# Patient Record
Sex: FEMALE | Race: WHITE | NOT HISPANIC OR LATINO | Employment: OTHER | URBAN - METROPOLITAN AREA
[De-identification: names, ages, dates, MRNs, and addresses within clinical notes are randomized per-mention and may not be internally consistent; named-entity substitution may affect disease eponyms.]

---

## 2023-06-29 PROBLEM — E66.01 MORBID OBESITY WITH BMI OF 40.0-44.9, ADULT: Status: ACTIVE | Noted: 2018-12-18

## 2023-06-29 PROBLEM — Z87.891 HISTORY OF TOBACCO ABUSE: Status: ACTIVE | Noted: 2018-12-18

## 2023-06-29 PROBLEM — I25.10 CAD IN NATIVE ARTERY: Status: ACTIVE | Noted: 2018-12-18

## 2023-06-29 PROBLEM — F32.A DEPRESSION: Status: ACTIVE | Noted: 2023-06-29

## 2023-06-29 PROBLEM — E78.5 DYSLIPIDEMIA, GOAL LDL BELOW 70: Status: ACTIVE | Noted: 2018-12-18

## 2023-06-29 PROBLEM — I73.9 PAD (PERIPHERAL ARTERY DISEASE): Status: ACTIVE | Noted: 2019-01-23

## 2023-06-29 PROBLEM — Z99.89 OBSTRUCTIVE SLEEP APNEA ON CPAP: Status: ACTIVE | Noted: 2018-12-18

## 2023-06-29 PROBLEM — G47.33 OBSTRUCTIVE SLEEP APNEA ON CPAP: Status: ACTIVE | Noted: 2018-12-18

## 2023-06-29 PROBLEM — R73.03 PREDIABETES: Status: ACTIVE | Noted: 2023-06-29

## 2023-06-29 PROBLEM — J44.9 COPD (CHRONIC OBSTRUCTIVE PULMONARY DISEASE): Status: ACTIVE | Noted: 2023-06-29

## 2023-06-29 PROBLEM — I10 HTN (HYPERTENSION), BENIGN: Status: ACTIVE | Noted: 2018-12-18

## 2023-08-11 ENCOUNTER — OFFICE VISIT (OUTPATIENT)
Dept: ORTHOPEDIC SURGERY | Facility: CLINIC | Age: 73
End: 2023-08-11
Payer: MEDICARE

## 2023-08-11 VITALS — BODY MASS INDEX: 38.98 KG/M2 | HEIGHT: 63 IN | WEIGHT: 220 LBS

## 2023-08-11 DIAGNOSIS — M17.0 BILATERAL PRIMARY OSTEOARTHRITIS OF KNEE: Primary | ICD-10-CM

## 2023-08-11 NOTE — PROGRESS NOTES
Subjective:     Patient ID: Edilma Doss is a 72 y.o. female.    Chief Complaint:    History of Present Illness  Edilma Doss returns to clinic today for evaluation of right greater than left knee pain.  Patient states that the knee injection on the right knee helped significantly but then she thinks she overdid it a little bit with her house move.  She states over the last few days she is rested and ice her knee and it is improved significantly.  Her left knee is not bothering her much in terms of pain but she does notice popping clicking and grinding when she ambulates.  She is not interested in another knee injection in either knee at this point in time.  She is hopeful to forego knee replacement surgery for as long as possible.     Social History     Occupational History    Not on file   Tobacco Use    Smoking status: Former     Packs/day: 2.00     Years: 30.00     Pack years: 60.00     Types: Cigarettes     Start date: 1966     Quit date: 11/10/1999     Years since quittin.7     Passive exposure: Past    Smokeless tobacco: Never   Vaping Use    Vaping Use: Never used   Substance and Sexual Activity    Alcohol use: Never    Drug use: Never    Sexual activity: Not Currently     Partners: Male     Birth control/protection: Tubal ligation      Past Medical History:   Diagnosis Date    Bursitis of hip hurts when walking and sleeping on side.    COPD (chronic obstructive pulmonary disease)     CTS (carpal tunnel syndrome) yes,  ?    Hip arthrosis 1122/    pain when walking    Knee sprain twisted it Sore    Knee swelling     Periarthritis of shoulder grinds    Tear of meniscus of knee left knee, 5yrs. ago now right knee I think.     Past Surgical History:   Procedure Laterality Date    GALLBLADDER SURGERY      HAND SURGERY  both thumb joints from repitation movements    SHOULDER SURGERY      TRIGGER POINT INJECTION  knleft knee       Family History   Problem Relation Age of Onset    Diabetes  "Mother     Cancer Sister                  Objective:  Vitals:    08/11/23 1310   Weight: 99.8 kg (220 lb)   Height: 160 cm (63\")         08/11/23  1310   Weight: 99.8 kg (220 lb)     Body mass index is 38.97 kg/mý.        Right Knee Exam     Tenderness   The patient is experiencing tenderness in the medial retinaculum and medial joint line.    Range of Motion   Extension:  0   Flexion:  120     Tests   Varus: negative Valgus: negative  Lachman:  Anterior - negative    Posterior - negative  Drawer:  Anterior - negative    Posterior - negative    Other   Erythema: absent  Scars: absent  Sensation: normal  Pulse: present  Swelling: none  Effusion: no effusion present      Left Knee Exam     Tenderness   The patient is experiencing tenderness in the medial retinaculum and medial joint line.    Range of Motion   Extension:  5   Flexion:  110     Tests   Varus: negative Valgus: negative  Lachman:  Anterior - negative    Posterior - negative  Drawer:  Anterior - negative     Posterior - negative    Other   Erythema: absent  Scars: absent  Sensation: normal  Pulse: present  Swelling: mild  Effusion: no effusion present             Imaging: no new    Assessment:        1. Bilateral primary osteoarthritis of knee           Plan:          Discussed treatment options at length with patient at today's visit.  I discussed with the patient that she has left greater than right knee osteoarthritis but at this point in time her left knee is mostly asymptomatic.  Her right knee is improving after few days of rest.  I discussed with her that I would forego knee replacement surgery for as long as possible and that we treated people not x-rays.  I discussed with her that I cannot inject her right knee again yet but I could inject left at any time.  I discussed that it would be in her best interest to forego another knee injection on the right and get on the same schedule so we can inject both knees every 3 months if needed.  I discussed " knee replacement surgery with the patient and answered all of her questions.  She states she would like to think about it and come back and see me at the end of September or early October.  Follow up: September or October 2023 with 4 standing views of bilateral knees      Edilma Doss was in agreement with plan and had all questions answered.     Medications:  No orders of the defined types were placed in this encounter.      Followup:  No follow-ups on file.    Diagnoses and all orders for this visit:    1. Bilateral primary osteoarthritis of knee (Primary)          Dictated utilizing Dragon dictation

## 2023-09-06 ENCOUNTER — OFFICE VISIT (OUTPATIENT)
Dept: CARDIOLOGY | Facility: CLINIC | Age: 73
End: 2023-09-06
Payer: MEDICARE

## 2023-09-06 ENCOUNTER — LAB (OUTPATIENT)
Dept: LAB | Facility: HOSPITAL | Age: 73
End: 2023-09-06
Payer: MEDICARE

## 2023-09-06 VITALS
OXYGEN SATURATION: 98 % | HEART RATE: 55 BPM | DIASTOLIC BLOOD PRESSURE: 80 MMHG | WEIGHT: 210 LBS | BODY MASS INDEX: 37.21 KG/M2 | HEIGHT: 63 IN | SYSTOLIC BLOOD PRESSURE: 122 MMHG

## 2023-09-06 DIAGNOSIS — E78.5 DYSLIPIDEMIA, GOAL LDL BELOW 70: ICD-10-CM

## 2023-09-06 DIAGNOSIS — I73.9 PAD (PERIPHERAL ARTERY DISEASE): ICD-10-CM

## 2023-09-06 DIAGNOSIS — I25.10 CAD IN NATIVE ARTERY: ICD-10-CM

## 2023-09-06 DIAGNOSIS — E78.5 HYPERLIPIDEMIA, UNSPECIFIED HYPERLIPIDEMIA TYPE: ICD-10-CM

## 2023-09-06 DIAGNOSIS — R00.2 PALPITATIONS: ICD-10-CM

## 2023-09-06 DIAGNOSIS — Z87.891 HISTORY OF TOBACCO ABUSE: Primary | ICD-10-CM

## 2023-09-06 LAB
CHOLEST SERPL-MCNC: 139 MG/DL (ref 0–200)
HDLC SERPL-MCNC: 46 MG/DL (ref 40–60)
LDLC SERPL CALC-MCNC: 71 MG/DL (ref 0–100)
LDLC/HDLC SERPL: 1.49 {RATIO}
TRIGL SERPL-MCNC: 123 MG/DL (ref 0–150)
VLDLC SERPL-MCNC: 22 MG/DL (ref 5–40)

## 2023-09-06 PROCEDURE — 36415 COLL VENOUS BLD VENIPUNCTURE: CPT

## 2023-09-06 PROCEDURE — 80061 LIPID PANEL: CPT

## 2023-09-06 RX ORDER — EZETIMIBE 10 MG/1
10 TABLET ORAL DAILY
Qty: 90 TABLET | Refills: 3 | Status: SHIPPED | OUTPATIENT
Start: 2023-09-06

## 2023-09-06 NOTE — PROGRESS NOTES
[facility]    Subjective:     Encounter Date:09/06/23      Patient ID: Edilmanatasha Doss is a 72 y.o. female.    Chief Complaint: No chief complaint on file.  Establish care for CAD  History of Present Illness    Ms. Doss is a 72-year-old with a past medical history hypertension, hyperlipidemia, coronary disease status post PCI 10 years ago at [facility], borderline ischemic cardiomyopathy who presents for further evaluation.      She previously followed the cardiologist at [facility] in [place].  She currently feels well.    She reports intermittent palpitation episodes which is having randomly, sporadically for minutes at a time over the last several years.  She is never had a monitor.  Symptoms she describes as a occasional prominent heartbeat sensation and skipped beats, no heart racing.  Probably PVCs.  She has no chest pain.  She has mild dyspnea on exertion which is attributed to COPD.    As part of her work-up for dyspnea, she underwent a stress test which in 2018 showed a small amount of apical ischemia reportedly, is being managed medically.  She has no overt angina.  She also reports that she had ABIs which revealed peripheral vascular disease, no significant claudication.  An echo in January 2019 showed LVEF of 45 to 50%.      The following portions of the patient's history were reviewed and updated as appropriate: allergies, current medications, past family history, past medical history, past social history, past surgical history and problem list.    Past Medical History:   Diagnosis Date    Bursitis of hip hurts when walking and sleeping on side.    COPD (chronic obstructive pulmonary disease)     CTS (carpal tunnel syndrome) yes, 1986 ?    Hip arthrosis 1122/    pain when walking    Knee sprain twisted it Sore    Knee swelling     Periarthritis of shoulder grinds    Tear of meniscus of knee left knee, 5yrs. ago now right knee I think.       Past  "Surgical History:   Procedure Laterality Date    GALLBLADDER SURGERY      HAND SURGERY  both thumb joints from repitation movements    SHOULDER SURGERY      TRIGGER POINT INJECTION  knleft knee           ECG 12 Lead    Date/Time: 9/6/2023 1:30 PM  Performed by: Vega Javier MD  Authorized by: Vega Javier MD   Previous ECG: no previous ECG available  Rhythm: sinus bradycardia  Rate: bradycardic  Conduction: conduction normal  ST Segments: ST segments normal  T Waves: T waves normal  QRS axis: normal  Other findings: left ventricular hypertrophy    Clinical impression: non-specific ECG           Objective:     Vitals:    09/06/23 1256   BP: 122/80   Pulse: 55   SpO2: 98%   Weight: 95.3 kg (210 lb)   Height: 160 cm (63\")         Constitutional:       Appearance: Healthy appearance. Not in distress.   Neck:      Vascular: JVD normal.   Pulmonary:      Effort: Pulmonary effort is normal.      Breath sounds: Normal breath sounds.   Cardiovascular:      PMI at left midclavicular line. Normal rate. Regular rhythm. Normal S2.       Murmurs: There is no murmur.   Pulses:     Decreased pulses.      Radial: 2+ bilaterally.     Dorsalis pedis: 2+ bilaterally.     Posterior tibial: 1+ on the left side and 2+ on the right side.  Edema:     Peripheral edema absent.   Skin:     General: Skin is warm and dry.   Neurological:      General: No focal deficit present.      Mental Status: Alert, oriented to person, place, and time and oriented to person, place and time.   Psychiatric:         Mood and Affect: Mood and affect normal.       Lab Review:       No results found for: BUN, CREATININE, K, ALT, AST    I reviewed her labs from [place] which revealed an LDL of 75 obtained in June 2023  Performed        Assessment:          Diagnosis Plan   1. History of tobacco abuse        2. PAD (peripheral artery disease)        3. CAD in native artery  Adult Transthoracic Echo Complete w/ Color, Spectral and Contrast if necessary per " protocol    Lipid Panel    Holter Monitor - 72 Hour Up To 15 Days      4. Dyslipidemia, goal LDL below 70        5. Palpitations  Adult Transthoracic Echo Complete w/ Color, Spectral and Contrast if necessary per protocol    Lipid Panel    Holter Monitor - 72 Hour Up To 15 Days      6. Hyperlipidemia, unspecified hyperlipidemia type  Adult Transthoracic Echo Complete w/ Color, Spectral and Contrast if necessary per protocol    Lipid Panel    Holter Monitor - 72 Hour Up To 15 Days             Plan:         Coronary disease status post PCI, remote, [facility]: Free of angina  Mild ischemic cardiomyopathy EF 45 to 50%  Hyperlipidemia  Given her palpitations, below, will repeat echo  Repeat echo to tailor medical therapy, she may need to be transitioned to metoprolol succinate from metoprolol tartrate  May need to add losartan if remains low  Recheck lipids today, may need to add Zetia if remains greater than 70  Or last time was 10 years ago, she has mild peripheral vascular disease and is asymptomatic.  She has been on dual antiplatelet therapy for the last 10 years.  She denies ever being considered be taken off DAPT.  At this point, given the remote PCI, and her advancing age, with bruising on her arms, I favor stopping the aspirin going on Plavix monotherapy.  Patient to stop aspirin 81  Palpitations: Fleeting, short episodes that are likely symptomatic PVCs.  She is on metoprolol which I will continue for now.  We will further evaluate with a 2 weeks patch monitor given her infrequent episodes to make sure there is no other high risk arrhythmias    Thank you for allowing me to participate in the care of Edilma Doss. Feel free to contact me directly with any further questions or concerns.    RTC 6 months for symptom recheck.  Arranging for echocardiogram, possible change in medical therapy, and lipid panel today.  We will try to arrange follow-up with her  to consolidate her visits  together.    Vega Javier MD  [facility]  09/06/23  13:04 EDT       Current Outpatient Medications:     atorvastatin (LIPITOR) 40 MG tablet, Take 1 tablet by mouth Daily., Disp: , Rfl:     cholecalciferol (VITAMIN D3) 10 MCG (400 UNIT) tablet, Take 1 tablet by mouth., Disp: , Rfl:     CINNAMON PO, Take  by mouth., Disp: , Rfl:     clopidogrel (PLAVIX) 75 MG tablet, Take 1 tablet by mouth Daily., Disp: , Rfl:     clotrimazole-betamethasone (LOTRISONE) 1-0.05 % cream, 1 cream two times daily, Disp: , Rfl:     escitalopram (LEXAPRO) 10 MG tablet, Take 1 tablet by mouth., Disp: , Rfl:     Fluticasone-Umeclidin-Vilant (Trelegy Ellipta) 100-62.5-25 MCG/ACT inhaler, Inhale 1 puff., Disp: , Rfl:     Magnesium 250 MG tablet, Take  by mouth., Disp: , Rfl:     metoprolol tartrate (LOPRESSOR) 50 MG tablet, Take 1 tablet by mouth 2 (Two) Times a Day., Disp: , Rfl:     nitroglycerin (NITROSTAT) 0.4 MG SL tablet, DISSOLVE 1 TAB UNDER TONGUE AS NEEDED FOR CHEST PAIN, MAY REPEAT EVERY 5 MINS FOR A MAX OF 3 TABS. THEN CALL 911, Disp: , Rfl:     Semaglutide (Rybelsus) 7 MG tablet, Take 7 mg by mouth Daily., Disp: 30 tablet, Rfl: 0    vitamin C (ASCORBIC ACID) 250 MG tablet, Take 2 tablets by mouth Daily., Disp: , Rfl:     Flaxseed, Linseed, (Flax Seed Oil) 1000 MG capsule, Take 1,000 mg by mouth. (Patient not taking: Reported on 9/6/2023), Disp: , Rfl:          Return in about 6 months (around 3/6/2024).      Part of this note may be an electronic transcription/translation of spoken language to printed text using the Dragon Dictation System.

## 2023-09-06 NOTE — PROGRESS NOTES
Can we please call and let Ms. Doss know that her cholesterol is almost there.  Her bad cholesterol, LDL is 71.  In patients who are at high risk for cardiovascular issues, including prior stents, we just like to see this less than 70, and sometimes less than 55.  I think we can get closer to our goal if we had that medication called Zetia, 10 mg, and take it with the atorvastatin.  This is a benign medication that is well-tolerated without any significant side effects.  Especially in patients who have had a stress test with a possible positive result, like yourself, I think Zetia will do the trick to reduce the risk of future heart attacks.

## 2023-09-06 NOTE — PATIENT INSTRUCTIONS
I would like for you to stop the aspirin, and just take plavix (clopidogrel) 75mg by itself. This is all the blood thinner you need.     We will folloup on your ultrasound and your monitor results to check on your symptoms.     We might need to add zetia to your regimen if your bad cholesterol LDL is still > 70.

## 2023-09-20 DIAGNOSIS — R73.03 PREDIABETES: ICD-10-CM

## 2023-09-20 DIAGNOSIS — I25.10 CAD IN NATIVE ARTERY: ICD-10-CM

## 2023-09-20 DIAGNOSIS — E66.01 MORBID OBESITY WITH BMI OF 40.0-44.9, ADULT: ICD-10-CM

## 2023-09-21 RX ORDER — ORAL SEMAGLUTIDE 7 MG/1
7 TABLET ORAL DAILY
Qty: 30 TABLET | Refills: 2 | Status: SHIPPED | OUTPATIENT
Start: 2023-09-21

## 2023-10-10 ENCOUNTER — HOSPITAL ENCOUNTER (OUTPATIENT)
Dept: CARDIOLOGY | Facility: HOSPITAL | Age: 73
Discharge: HOME | End: 2023-10-10
Admitting: STUDENT IN AN ORGANIZED HEALTH CARE EDUCATION/TRAINING PROGRAM
Payer: MEDICARE

## 2023-10-10 DIAGNOSIS — R00.2 PALPITATIONS: ICD-10-CM

## 2023-10-10 DIAGNOSIS — I25.10 CAD IN NATIVE ARTERY: ICD-10-CM

## 2023-10-10 DIAGNOSIS — E78.5 HYPERLIPIDEMIA, UNSPECIFIED HYPERLIPIDEMIA TYPE: ICD-10-CM

## 2023-10-10 PROCEDURE — 93246 EXT ECG>7D<15D RECORDING: CPT

## 2023-10-16 ENCOUNTER — OFFICE VISIT (OUTPATIENT)
Dept: FAMILY MEDICINE CLINIC | Facility: CLINIC | Age: 73
End: 2023-10-16
Payer: MEDICARE

## 2023-10-16 VITALS
WEIGHT: 211 LBS | BODY MASS INDEX: 37.39 KG/M2 | RESPIRATION RATE: 18 BRPM | SYSTOLIC BLOOD PRESSURE: 126 MMHG | HEART RATE: 72 BPM | HEIGHT: 63 IN | OXYGEN SATURATION: 93 % | DIASTOLIC BLOOD PRESSURE: 82 MMHG

## 2023-10-16 DIAGNOSIS — R73.03 PREDIABETES: ICD-10-CM

## 2023-10-16 DIAGNOSIS — E78.5 DYSLIPIDEMIA, GOAL LDL BELOW 70: ICD-10-CM

## 2023-10-16 DIAGNOSIS — E66.01 MORBID OBESITY WITH BMI OF 40.0-44.9, ADULT: ICD-10-CM

## 2023-10-16 DIAGNOSIS — F33.41 RECURRENT MAJOR DEPRESSIVE DISORDER, IN PARTIAL REMISSION: Primary | ICD-10-CM

## 2023-10-16 RX ORDER — ESCITALOPRAM OXALATE 10 MG/1
10 TABLET ORAL DAILY
Qty: 90 TABLET | Refills: 2 | Status: SHIPPED | OUTPATIENT
Start: 2023-10-16

## 2023-10-16 RX ORDER — ESCITALOPRAM OXALATE 10 MG/1
10 TABLET ORAL DAILY
Qty: 30 TABLET | Refills: 2 | Status: SHIPPED | OUTPATIENT
Start: 2023-10-16 | End: 2023-10-16 | Stop reason: SDUPTHER

## 2023-10-16 NOTE — PROGRESS NOTES
"    Ferny Mosley DO  [facility]  [address] [place] [address] [address]  [place] [id number] [phone]    Subjective      Name Edilma Doss MRN [id number]    1950 AGE/SEX 72 y.o. / female      Chief Complaint Chief Complaint   Patient presents with    Weight Check     Her for weight check and follow up appointment          Visit History for  10/16/2023    History of Present Illness  Edilma Doss is a 72 y.o. female who presented today for Weight Check (Her for weight check and follow up appointment )    The patient reports that she has been feeling good, although she has been mildly anxious. She has just been anxious regarding everything. She has been off her escitalopram (Lexapro) for 2 months. She went from 20 mg to 10 mg of the escitalopram (Lexapro). She states that small things bother her. She is still struggling to decide whether she will take the escitalopram (Lexapro) again or not. She states that she has been significantly stressed as she just finished moving places. She notes that she did most of the tasks. She is still highly stressed at this time.    The patient reports that she has been on semaglutide (Rybelsus) 3 mg for 1 month before she went to this clinic. She was then switched from semaglutide (Rybelsus) 3 mg to 7 mg. She reports that she felt fine on the semaglutide (Rybelsus) 7 mg. She states that she has finally adjusted herself to the nausea, headaches, and other side effects of the semaglutide (Rybelsus) after 1 to 2 months. She has also noticed that the semaglutide (Rybelsus) \"curves her appetite.\" According to her own weighing scale at home, she weighs 205 pounds. She feels that she had lost at least 30 pounds since her last visit at this clinic. Back in 2023, she weighed 220 pounds. Last visit at this clinic, she weighed 210 pounds. Today, 10/16/2023, she weighs 211 pounds. She agrees that she is wearing more clothes today, 10/16/2023. She prefers to " "have a slow, steady weight loss.    The patient's last laboratory tests were from 06/2023. She had her cholesterol checked in 09/2023. She reports that she had been switched by her cardiologist from aspirin 81 mg to ezetimibe (Zetia), as her cholesterol level was significantly elevated.    The patient inquires if she needs to obtain a respiratory syncytial virus injection. She states that she has been diagnosed with chronic obstructive pulmonary disease and has been on fluticasone-umeclidin-vilant (Trelegy Ellipta). She is around her young grandchildren. She states that she loves using the Neti Pot.      Medications and Allergies   Current Outpatient Medications   Medication Instructions    atorvastatin (LIPITOR) 40 mg, Oral, Daily    cholecalciferol (VITAMIN D3) 400 Units, Oral    CINNAMON PO Oral    clopidogrel (PLAVIX) 75 mg, Oral, Daily    clotrimazole-betamethasone (LOTRISONE) 1-0.05 % cream 1 cream two times daily    escitalopram (LEXAPRO) 10 mg, Oral, Daily    ezetimibe (ZETIA) 10 mg, Oral, Daily    Flax Seed Oil 1,000 mg, Oral    Fluticasone-Umeclidin-Vilant (Trelegy Ellipta) 100-62.5-25 MCG/ACT inhaler 1 puff, Inhalation    Magnesium 250 MG tablet Oral    metoprolol tartrate (LOPRESSOR) 50 mg, Oral, 2 Times Daily    nitroglycerin (NITROSTAT) 0.4 MG SL tablet     Rybelsus 7 mg, Oral, Daily    vitamin C (ASCORBIC ACID) 500 mg, Oral, Daily     No Known Allergies   I have reviewed the above medications and allergies     Objective:      Vitals Vitals:    10/16/23 1339   BP: 126/82   BP Location: Right arm   Patient Position: Sitting   Cuff Size: Large Adult   Pulse: 72   Resp: 18   SpO2: 93%   Weight: 95.7 kg (211 lb)   Height: 160 cm (63\")     Body mass index is 37.38 kg/m².    Physical Exam       Assessment/Plan      Issues Addressed/ Plan  1. Recurrent major depressive disorder, in partial remission  - Continue medication at this time.  Going to continue to wait to see how things adjust after moving is " completed and settled.  No changes to medication at this time.    - escitalopram (LEXAPRO) 10 MG tablet; Take 1 tablet by mouth Daily.  Dispense: 90 tablet; Refill: 2    2. Morbid obesity with BMI of 40.0-44.9, adult  3. Prediabetes  - Currently on Rybelsus.  Current dose seems to be helping enough to curb appitie and she is losing wt.  Medication should also be helping with current A1c and BG.  Going to continue with medication at current dose. No changes at this time.   - Working on diet and exercise as well.    - Repeat labs in 2-3 months.        4. Dyslipidemia, goal LDL below 70  - Taking medication as directed at this time. No changes needed.  Going to repeat labs in near future.       There are no Patient Instructions on file for this visit.           Follow up  recommended Return in about 3 months (around 1/16/2024) for Blood Pressure, Wt check, Diabetes.   - Dragon voice recognition software was utilized to complete this chart.  Every reasonable attempt was made to edit and correct the text, however some incorrect words may remain.   Ferny Mosley DO    Transcribed from ambient dictation for Ferny Mosley DO by Bill Garcia.  10/16/23   17:54 EDT    Patient or patient representative verbalized consent to the visit recording.  I have personally performed the services described in this document as transcribed by the above individual, and it is both accurate and complete.

## 2023-10-19 ENCOUNTER — OFFICE VISIT (OUTPATIENT)
Dept: ORTHOPEDIC SURGERY | Facility: CLINIC | Age: 73
End: 2023-10-19
Payer: MEDICARE

## 2023-10-19 VITALS — HEIGHT: 63 IN | BODY MASS INDEX: 37.39 KG/M2 | WEIGHT: 211 LBS

## 2023-10-19 DIAGNOSIS — M17.0 BILATERAL PRIMARY OSTEOARTHRITIS OF KNEE: Primary | ICD-10-CM

## 2023-10-19 RX ORDER — LIDOCAINE HYDROCHLORIDE 10 MG/ML
4 INJECTION, SOLUTION EPIDURAL; INFILTRATION; INTRACAUDAL; PERINEURAL
Status: COMPLETED | OUTPATIENT
Start: 2023-10-19 | End: 2023-10-19

## 2023-10-19 RX ORDER — TRIAMCINOLONE ACETONIDE 40 MG/ML
80 INJECTION, SUSPENSION INTRA-ARTICULAR; INTRAMUSCULAR
Status: COMPLETED | OUTPATIENT
Start: 2023-10-19 | End: 2023-10-19

## 2023-10-19 RX ADMIN — LIDOCAINE HYDROCHLORIDE 4 ML: 10 INJECTION, SOLUTION EPIDURAL; INFILTRATION; INTRACAUDAL; PERINEURAL at 14:33

## 2023-10-19 RX ADMIN — TRIAMCINOLONE ACETONIDE 80 MG: 40 INJECTION, SUSPENSION INTRA-ARTICULAR; INTRAMUSCULAR at 14:33

## 2023-10-19 NOTE — PROGRESS NOTES
Encounter Diagnosis   Name Primary?    Bilateral primary osteoarthritis of knee Yes       Patient presents to clinic today for bilateral knee injection(s). I explained details of injections as well as risks, benefits and alternatives with the patient today, had all questions answered, wished to proceed with injection.  I will see patient back as needed for follow up on injections. Patient was instructed to watch for signs or symptoms of infection including redness, swelling, warmth to the touch, or significant increased pain and to contact our office immediately if any of these issues were noted.      Follow up: As needed but not before January 19, 2024 if she would like another round of injections with 4 views of bilateral knees      - Large Joint Arthrocentesis: bilateral knee on 10/19/2023 2:33 PM  Indications: pain  Details: 22 G needle, anterolateral approach  Medications (Right): 4 mL lidocaine PF 1% 1 %; 80 mg triamcinolone acetonide 40 MG/ML  Medications (Left): 4 mL lidocaine PF 1% 1 %; 80 mg triamcinolone acetonide 40 MG/ML  Outcome: tolerated well, no immediate complications  Procedure, treatment alternatives, risks and benefits explained, specific risks discussed. Consent was given by the patient. Immediately prior to procedure a time out was called to verify the correct patient, procedure, equipment, support staff and site/side marked as required. Patient was prepped and draped in the usual sterile fashion.

## 2023-11-07 ENCOUNTER — TELEPHONE (OUTPATIENT)
Dept: CARDIOLOGY | Facility: CLINIC | Age: 73
End: 2023-11-07
Payer: MEDICARE

## 2023-11-07 NOTE — TELEPHONE ENCOUNTER
Attempted to call pt, no answer and no VM,  will continue to try to reach pt.    Alma Houston, RN  Triage RN  11/07/23 08:49 EST

## 2023-11-07 NOTE — TELEPHONE ENCOUNTER
----- Message from Vega Javier MD sent at 11/6/2023  5:54 PM EST -----  Can we please call Ms. Doss and let her know that her heart monitor did show occasional premature heartbeats, but nothing that would require any special treatments.  Most of her symptoms actually did not correlate to any significant heart rhythm problems, but a few did correlate with to PVCs like we expected.  There were a few fast heartbeats, something called SVT, where the heart would have short runs were beat more quickly, sometimes up to 210 bpm, lasting for up to 20 seconds.  These, however were not associate with any symptoms.  Metoprolol can help treat these 2, but these also do not require treatment.  Overall a reassuring study, no changes needed.  Thank you for the help

## 2023-11-08 NOTE — TELEPHONE ENCOUNTER
Reviewed results and recommendations with Edilma Doss.  Patient verbalized understanding of results and recommendations.    Thank you,  Magdalena GARCIA RN  Triage Nurse [facility]   08:34 EST

## 2023-11-28 ENCOUNTER — TELEPHONE (OUTPATIENT)
Dept: FAMILY MEDICINE CLINIC | Facility: CLINIC | Age: 73
End: 2023-11-28
Payer: MEDICARE

## 2023-11-28 DIAGNOSIS — Z12.31 ENCOUNTER FOR SCREENING MAMMOGRAM FOR BREAST CANCER: Primary | ICD-10-CM

## 2023-12-29 ENCOUNTER — OFFICE VISIT (OUTPATIENT)
Dept: FAMILY MEDICINE CLINIC | Facility: CLINIC | Age: 73
End: 2023-12-29
Payer: MEDICARE

## 2023-12-29 VITALS
BODY MASS INDEX: 35.79 KG/M2 | DIASTOLIC BLOOD PRESSURE: 82 MMHG | SYSTOLIC BLOOD PRESSURE: 122 MMHG | HEART RATE: 67 BPM | WEIGHT: 202 LBS | OXYGEN SATURATION: 100 % | HEIGHT: 63 IN

## 2023-12-29 DIAGNOSIS — J01.40 ACUTE NON-RECURRENT PANSINUSITIS: ICD-10-CM

## 2023-12-29 DIAGNOSIS — R05.9 COUGH, UNSPECIFIED TYPE: Primary | ICD-10-CM

## 2023-12-29 DIAGNOSIS — I10 HTN (HYPERTENSION), BENIGN: ICD-10-CM

## 2023-12-29 DIAGNOSIS — I25.10 CAD IN NATIVE ARTERY: ICD-10-CM

## 2023-12-29 DIAGNOSIS — R73.03 PREDIABETES: ICD-10-CM

## 2023-12-29 DIAGNOSIS — E66.01 MORBID OBESITY WITH BMI OF 40.0-44.9, ADULT: ICD-10-CM

## 2023-12-29 DIAGNOSIS — E55.9 VITAMIN D DEFICIENCY: ICD-10-CM

## 2023-12-29 DIAGNOSIS — E78.5 DYSLIPIDEMIA, GOAL LDL BELOW 70: ICD-10-CM

## 2023-12-29 LAB
EXPIRATION DATE: NORMAL
FLUAV AG UPPER RESP QL IA.RAPID: NOT DETECTED
FLUBV AG UPPER RESP QL IA.RAPID: NOT DETECTED
INTERNAL CONTROL: NORMAL
Lab: NORMAL
SARS-COV-2 AG UPPER RESP QL IA.RAPID: NOT DETECTED

## 2023-12-29 PROCEDURE — 1160F RVW MEDS BY RX/DR IN RCRD: CPT | Performed by: STUDENT IN AN ORGANIZED HEALTH CARE EDUCATION/TRAINING PROGRAM

## 2023-12-29 PROCEDURE — 1159F MED LIST DOCD IN RCRD: CPT | Performed by: STUDENT IN AN ORGANIZED HEALTH CARE EDUCATION/TRAINING PROGRAM

## 2023-12-29 PROCEDURE — 87428 SARSCOV & INF VIR A&B AG IA: CPT | Performed by: STUDENT IN AN ORGANIZED HEALTH CARE EDUCATION/TRAINING PROGRAM

## 2023-12-29 PROCEDURE — 36415 COLL VENOUS BLD VENIPUNCTURE: CPT | Performed by: STUDENT IN AN ORGANIZED HEALTH CARE EDUCATION/TRAINING PROGRAM

## 2023-12-29 PROCEDURE — 3079F DIAST BP 80-89 MM HG: CPT | Performed by: STUDENT IN AN ORGANIZED HEALTH CARE EDUCATION/TRAINING PROGRAM

## 2023-12-29 PROCEDURE — 3074F SYST BP LT 130 MM HG: CPT | Performed by: STUDENT IN AN ORGANIZED HEALTH CARE EDUCATION/TRAINING PROGRAM

## 2023-12-29 PROCEDURE — 99214 OFFICE O/P EST MOD 30 MIN: CPT | Performed by: STUDENT IN AN ORGANIZED HEALTH CARE EDUCATION/TRAINING PROGRAM

## 2023-12-29 RX ORDER — CEFDINIR 300 MG/1
300 CAPSULE ORAL 2 TIMES DAILY
Qty: 14 CAPSULE | Refills: 0 | Status: SHIPPED | OUTPATIENT
Start: 2023-12-29 | End: 2024-01-05

## 2023-12-29 RX ORDER — ORAL SEMAGLUTIDE 14 MG/1
14 TABLET ORAL DAILY
Qty: 30 TABLET | Refills: 2 | Status: SHIPPED | OUTPATIENT
Start: 2023-12-29

## 2023-12-29 RX ORDER — METHYLPREDNISOLONE 4 MG/1
TABLET ORAL
Qty: 21 TABLET | Refills: 0 | Status: SHIPPED | OUTPATIENT
Start: 2023-12-29

## 2023-12-29 NOTE — PROGRESS NOTES
Ferny Mosley,   [facility] PRIMARY [facility]ARE  [facility]  [place] [address] [phone]    Subjective      Name Edilma Doss MRN [id number]    1950 AGE/SEX 73 y.o. / female      Chief Complaint Chief Complaint   Patient presents with    Med Refill     Would like to get refills and would like to discuss medication for constipation    Sinusitis     X2 days, drainage, sinus pressure         Visit History for  2023    History of Present Illness  Edilma Doss is a 73 y.o. female who presented today for Med Refill (Would like to get refills and would like to discuss medication for constipation) and Sinusitis (X2 days, drainage, sinus pressure)    Presents to discuss increasing the Rybelsus recommended at last visit.     Has been drinking smoothies every morning and has not lost any weight.    Inquires if can be prescribed a medication for head cold that has been going on for the past 2 to 3 days. Has been around grandchildren all week who have been ill with cough and runny nose. Felt having a tonsillitis; however, no longer has tonsil. Felt hot last night; however, did not have a fever. Has been using a Neti pot.    Inquires what is the best medication to take for constipation. Has been using MiraLAX every other day or every 2 days. Does not think of drinking water until at night. Drinks a big glass of water every night. Does not drink fluids with meals.    Denies having any issues with vitamin D. Takes an over-the-counter vitamin D pill. Has been doubling vitamin C.    Has an appointment in the middle of 2024 to obtain a mammogram.    Not needing refills on other medications. Only needs refill on the semaglutide (Rybelsus).    Takes Coricidin for  blood pressure.      Medications and Allergies   Current Outpatient Medications   Medication Instructions    atorvastatin (LIPITOR) 40 mg, Oral, Daily    cholecalciferol (VITAMIN D3) 400 Units, Oral    CINNAMON PO  "Oral    clopidogrel (PLAVIX) 75 mg, Oral, Daily    clotrimazole-betamethasone (LOTRISONE) 1-0.05 % cream 1 cream two times daily    escitalopram (LEXAPRO) 10 mg, Oral, Daily    ezetimibe (ZETIA) 10 mg, Oral, Daily    Flax Seed Oil 1,000 mg, Oral    Fluticasone-Umeclidin-Vilant (Trelegy Ellipta) 100-62.5-25 MCG/ACT inhaler 1 puff, Inhalation    Magnesium 250 MG tablet Oral    methylPREDNISolone (MEDROL) 4 MG dose pack Take as directed on package instructions.    metoprolol tartrate (LOPRESSOR) 50 mg, Oral, 2 Times Daily    nitroglycerin (NITROSTAT) 0.4 MG SL tablet     Rybelsus 14 mg, Oral, Daily    vitamin C (ASCORBIC ACID) 500 mg, Oral, Daily     No Known Allergies   I have reviewed the above medications and allergies     Objective:      Vitals Vitals:    12/29/23 1105   BP: 122/82   BP Location: Right arm   Patient Position: Sitting   Cuff Size: Large Adult   Pulse: 67   SpO2: 100%   Weight: 91.6 kg (202 lb)   Height: 160 cm (63\")     Body mass index is 35.78 kg/m².    Physical Exam  Vitals reviewed.   Constitutional:       General: She is not in acute distress.     Appearance: She is not ill-appearing.   Pulmonary:      Effort: Pulmonary effort is normal.   Psychiatric:         Mood and Affect: Mood normal.         Behavior: Behavior normal.         Thought Content: Thought content normal.         Judgment: Judgment normal.            Assessment/Plan      Issues Addressed/ Plan   Diagnosis Plan   1. Cough, unspecified type  POCT SARS-CoV-2 Antigen KATIE + Flu      2. Prediabetes  Semaglutide (Rybelsus) 14 MG tablet    Hemoglobin A1c      3. CAD in native artery        4. Morbid obesity with BMI of 40.0-44.9, adult        5. Dyslipidemia, goal LDL below 70  Lipid panel      6. HTN (hypertension), benign  Comprehensive metabolic panel      7. Vitamin D deficiency  Vitamin D 25 hydroxy      8. Acute non-recurrent pansinusitis  methylPREDNISolone (MEDROL) 4 MG dose pack    cefdinir (OMNICEF) 300 MG capsule          " Upper respiratory infection  - Advised to be tested negative for all tests.  - Will be prescribed prednisone methylprednisolone (Medrol) dose pack.  - Advised to keep using the Neti pot.  - Will be prescribed an antibiotic of cefdinir (Omnicef) to be taken if feeling worse.     Prediabetes  - Will be increased on the semaglutide (Rybelsus) to 14 mg.    Constipation  - Advised to drink plenty of water.  - Advised to drink at least 8 ounces of water before meals.    Health maintenance  - Will be ordered new laboratory tests.  - Advised to follow up in 3 months.      Follow up  recommended Return in about 3 months (around 3/29/2024).   - Dragon voice recognition software was utilized to complete this chart.  Every reasonable attempt was made to edit and correct the text, however some incorrect words may remain.   Ferny Mosley DO      Transcribed from ambient dictation for Ferny Mosley DO by Bill Garcia.  12/29/23   12:40 EST    Patient or patient representative verbalized consent to the visit recording.  I have personally performed the services described in this document as transcribed by the above individual, and it is both accurate and complete.

## 2023-12-29 NOTE — PROGRESS NOTES
Venipuncture Blood Specimen Collection  Venipuncture performed in right arm by Jocy Pool MA with good hemostasis. Patient tolerated the procedure with difficulty. Patient passed out at the end of procedure. Dr. Mosley was present at the time as well as Stella Masterson LPN and .  Patient came to with some disorientation after a few minutes. Was given emesis bag and ice pack. Patient remained in clinic for 1 hour while she rested. She was alert and oriented when she left the clinic   12/29/23   Jocy Pool MA

## 2023-12-30 LAB
25(OH)D3+25(OH)D2 SERPL-MCNC: 39 NG/ML (ref 30–100)
ALBUMIN SERPL-MCNC: 4.5 G/DL (ref 3.5–5.2)
ALBUMIN/GLOB SERPL: 2 G/DL
ALP SERPL-CCNC: 73 U/L (ref 39–117)
ALT SERPL-CCNC: 18 U/L (ref 1–33)
AST SERPL-CCNC: 19 U/L (ref 1–32)
BILIRUB SERPL-MCNC: 0.6 MG/DL (ref 0–1.2)
BUN SERPL-MCNC: 10 MG/DL (ref 8–23)
BUN/CREAT SERPL: 11.2 (ref 7–25)
CALCIUM SERPL-MCNC: 10.2 MG/DL (ref 8.6–10.5)
CHLORIDE SERPL-SCNC: 103 MMOL/L (ref 98–107)
CHOLEST SERPL-MCNC: 117 MG/DL (ref 0–200)
CO2 SERPL-SCNC: 24.2 MMOL/L (ref 22–29)
CREAT SERPL-MCNC: 0.89 MG/DL (ref 0.57–1)
EGFRCR SERPLBLD CKD-EPI 2021: 67.7 ML/MIN/1.73
GLOBULIN SER CALC-MCNC: 2.2 GM/DL
GLUCOSE SERPL-MCNC: 99 MG/DL (ref 65–99)
HBA1C MFR BLD: 5.8 % (ref 4.8–5.6)
HDLC SERPL-MCNC: 52 MG/DL (ref 40–60)
LDLC SERPL CALC-MCNC: 44 MG/DL (ref 0–100)
POTASSIUM SERPL-SCNC: 5 MMOL/L (ref 3.5–5.2)
PROT SERPL-MCNC: 6.7 G/DL (ref 6–8.5)
SODIUM SERPL-SCNC: 139 MMOL/L (ref 136–145)
TRIGL SERPL-MCNC: 115 MG/DL (ref 0–150)
VLDLC SERPL CALC-MCNC: 21 MG/DL (ref 5–40)

## 2024-01-16 ENCOUNTER — OFFICE VISIT (OUTPATIENT)
Dept: CARDIOLOGY | Facility: CLINIC | Age: 74
End: 2024-01-16
Payer: MEDICARE

## 2024-01-16 VITALS
BODY MASS INDEX: 35.19 KG/M2 | WEIGHT: 198.6 LBS | HEART RATE: 64 BPM | SYSTOLIC BLOOD PRESSURE: 140 MMHG | HEIGHT: 63 IN | DIASTOLIC BLOOD PRESSURE: 78 MMHG | OXYGEN SATURATION: 100 %

## 2024-01-16 DIAGNOSIS — I25.10 CAD IN NATIVE ARTERY: Primary | ICD-10-CM

## 2024-01-16 DIAGNOSIS — I10 HTN (HYPERTENSION), BENIGN: ICD-10-CM

## 2024-01-16 DIAGNOSIS — E78.5 DYSLIPIDEMIA, GOAL LDL BELOW 70: ICD-10-CM

## 2024-01-16 DIAGNOSIS — I73.9 PAD (PERIPHERAL ARTERY DISEASE): ICD-10-CM

## 2024-01-16 PROCEDURE — 3078F DIAST BP <80 MM HG: CPT | Performed by: STUDENT IN AN ORGANIZED HEALTH CARE EDUCATION/TRAINING PROGRAM

## 2024-01-16 PROCEDURE — 99214 OFFICE O/P EST MOD 30 MIN: CPT | Performed by: STUDENT IN AN ORGANIZED HEALTH CARE EDUCATION/TRAINING PROGRAM

## 2024-01-16 PROCEDURE — 1159F MED LIST DOCD IN RCRD: CPT | Performed by: STUDENT IN AN ORGANIZED HEALTH CARE EDUCATION/TRAINING PROGRAM

## 2024-01-16 PROCEDURE — 1160F RVW MEDS BY RX/DR IN RCRD: CPT | Performed by: STUDENT IN AN ORGANIZED HEALTH CARE EDUCATION/TRAINING PROGRAM

## 2024-01-16 PROCEDURE — 3077F SYST BP >= 140 MM HG: CPT | Performed by: STUDENT IN AN ORGANIZED HEALTH CARE EDUCATION/TRAINING PROGRAM

## 2024-01-16 RX ORDER — ATORVASTATIN CALCIUM 40 MG/1
40 TABLET, FILM COATED ORAL NIGHTLY
Qty: 90 TABLET | Refills: 3 | Status: SHIPPED | OUTPATIENT
Start: 2024-01-16 | End: 2025-01-15

## 2024-01-16 RX ORDER — EZETIMIBE 10 MG/1
10 TABLET ORAL DAILY
Qty: 90 TABLET | Refills: 3 | Status: SHIPPED | OUTPATIENT
Start: 2024-01-16 | End: 2024-01-16 | Stop reason: SDUPTHER

## 2024-01-16 RX ORDER — NITROGLYCERIN 0.4 MG/1
0.4 TABLET SUBLINGUAL
Qty: 30 TABLET | Refills: 11 | Status: SHIPPED | OUTPATIENT
Start: 2024-01-16 | End: 2025-01-15

## 2024-01-16 RX ORDER — CLOPIDOGREL BISULFATE 75 MG/1
75 TABLET ORAL DAILY
Qty: 90 TABLET | Refills: 3 | Status: SHIPPED | OUTPATIENT
Start: 2024-01-16 | End: 2025-01-15

## 2024-01-16 RX ORDER — CLOPIDOGREL BISULFATE 75 MG/1
75 TABLET ORAL DAILY
Qty: 90 TABLET | Refills: 3 | Status: SHIPPED | OUTPATIENT
Start: 2024-01-16 | End: 2024-01-16 | Stop reason: SDUPTHER

## 2024-01-16 RX ORDER — METOPROLOL SUCCINATE 50 MG/1
50 TABLET, EXTENDED RELEASE ORAL DAILY
Qty: 90 TABLET | Refills: 3 | Status: SHIPPED | OUTPATIENT
Start: 2024-01-16 | End: 2025-01-15

## 2024-01-16 RX ORDER — METOPROLOL TARTRATE 50 MG/1
50 TABLET, FILM COATED ORAL 2 TIMES DAILY
Qty: 180 TABLET | Refills: 3 | Status: SHIPPED | OUTPATIENT
Start: 2024-01-16 | End: 2024-01-16

## 2024-01-16 RX ORDER — ATORVASTATIN CALCIUM 40 MG/1
40 TABLET, FILM COATED ORAL NIGHTLY
Qty: 90 TABLET | Refills: 3 | Status: SHIPPED | OUTPATIENT
Start: 2024-01-16 | End: 2024-01-16 | Stop reason: SDUPTHER

## 2024-01-16 RX ORDER — METOPROLOL SUCCINATE 50 MG/1
50 TABLET, EXTENDED RELEASE ORAL DAILY
Qty: 90 TABLET | Refills: 3 | Status: SHIPPED | OUTPATIENT
Start: 2024-01-16 | End: 2024-01-16 | Stop reason: SDUPTHER

## 2024-01-16 RX ORDER — EZETIMIBE 10 MG/1
10 TABLET ORAL DAILY
Qty: 90 TABLET | Refills: 3 | Status: SHIPPED | OUTPATIENT
Start: 2024-01-16

## 2024-01-16 NOTE — PROGRESS NOTES
[facility]    Subjective:     Encounter Date:01/16/24      Patient ID: Edilmanatasha Doss is a 73 y.o. female.    Chief Complaint: No chief complaint on file.  Establish care for CAD  History of Present Illness    Ms. Doss is a 72-year-old with a past medical history hypertension, hyperlipidemia, coronary disease status post PCI 10 years ago at [facility], borderline ischemic cardiomyopathy who presents for further evaluation.      She previously followed the cardiologist at [facility] in [place].  She currently feels well.    She reports intermittent palpitation episodes which is having randomly, sporadically for minutes at a time over the last several years.  Subsequent Holter monitor revealed some short runs of atrial tachycardia.  Improved with metoprolol for which she continues.    As part of her work-up for dyspnea, she underwent a stress test which in 2018 showed a small amount of apical ischemia reportedly, is being managed medically.  She has no overt angina.  She also reports that she had ABIs which revealed peripheral vascular disease, no significant claudication.  An echo in January 2019 showed LVEF of 45 to 50%.    Since her last visit, she is continuing to build medical therapy and she has done well.  The Holter monitor that revealed a significant issues.  Echo was unable to be scheduled.  Otherwise she is doing well.    The following portions of the patient's history were reviewed and updated as appropriate: allergies, current medications, past family history, past medical history, past social history, past surgical history and problem list.    Past Medical History:   Diagnosis Date    Allergic     Anxiety     Bursitis of hip hurts when walking and sleeping on side.    COPD (chronic obstructive pulmonary disease)     CTS (carpal tunnel syndrome) yes, 1986 ?    Depression     Frozen shoulder     Hip arthrosis 1122/    pain when walking    Knee sprain  "twisted it Sore    Knee swelling     Obesity     Periarthritis of shoulder grinds    Tear of meniscus of knee left knee, 5yrs. ago now right knee I think.       Past Surgical History:   Procedure Laterality Date    CHOLECYSTECTOMY      COLONOSCOPY      CORONARY STENT PLACEMENT      GALLBLADDER SURGERY      HAND SURGERY  both thumb joints from repitation movements    SHOULDER SURGERY      TONSILLECTOMY      TRIGGER POINT INJECTION  knleft knee    TUBAL ABDOMINAL LIGATION           Procedures       Objective:     Vitals:    01/16/24 1514   BP: 140/78   BP Location: Left arm   Pulse: 64   SpO2: 100%   Weight: 90.1 kg (198 lb 9.6 oz)   Height: 160 cm (63\")         Constitutional:       Appearance: Healthy appearance. Not in distress.   Neck:      Vascular: JVD normal.   Pulmonary:      Effort: Pulmonary effort is normal.      Breath sounds: Normal breath sounds.   Cardiovascular:      PMI at left midclavicular line. Normal rate. Regular rhythm. Normal S2.       Murmurs: There is no murmur.   Pulses:     Decreased pulses.      Radial: 2+ bilaterally.     Dorsalis pedis: 2+ bilaterally.     Posterior tibial: 1+ on the left side and 2+ on the right side.  Edema:     Peripheral edema absent.   Skin:     General: Skin is warm and dry.   Neurological:      General: No focal deficit present.      Mental Status: Alert, oriented to person, place, and time and oriented to person, place and time.   Psychiatric:         Mood and Affect: Mood and affect normal.         Lab Review:     Lipid Panel          9/6/2023    13:47 12/29/2023    11:50   Lipid Panel   Total Cholesterol 139     Total Cholesterol  117    Triglycerides 123  115    HDL Cholesterol 46  52    VLDL Cholesterol 22  21    LDL Cholesterol  71  44    LDL/HDL Ratio 1.49       BUN   Date Value Ref Range Status   12/29/2023 10 8 - 23 mg/dL Final     Creatinine   Date Value Ref Range Status   12/29/2023 0.89 0.57 - 1.00 mg/dL Final     Potassium   Date Value Ref Range " Status   12/29/2023 5.0 3.5 - 5.2 mmol/L Final     Comment:     Slight hemolysis detected by analyzer. Result may be  falsely elevated.       ALT (SGPT)   Date Value Ref Range Status   12/29/2023 18 1 - 33 U/L Final     AST (SGOT)   Date Value Ref Range Status   12/29/2023 19 1 - 32 U/L Final     LDL reviewed, 44.  She is intentionally losing weight.        Assessment:          Diagnosis Plan   1. CAD in native artery        2. Dyslipidemia, goal LDL below 70        3. HTN (hypertension), benign  nitroglycerin (NITROSTAT) 0.4 MG SL tablet    ezetimibe (ZETIA) 10 MG tablet    clopidogrel (PLAVIX) 75 MG tablet    atorvastatin (LIPITOR) 40 MG tablet      4. PAD (peripheral artery disease)  nitroglycerin (NITROSTAT) 0.4 MG SL tablet    ezetimibe (ZETIA) 10 MG tablet    clopidogrel (PLAVIX) 75 MG tablet    atorvastatin (LIPITOR) 40 MG tablet               Plan:         Coronary disease status post PCI, remote, [facility]: Free of angina  Mild ischemic cardiomyopathy EF 45 to 50%  Hyperlipidemia  Repeat echo pending    Continue metoprolol succinate 50 daily  May need to consider ARB if her EF remains diminished   Continue Plavix monotherapy, given her mild PVD and known CAD.  She had significant arm bruising with no other medical therapy   Palpitations: Fleeting, short episodes that are likely symptomatic PVCs.  2-week patch Holter did not have any significant arrhythmias.    Obesity.  She continues with weight loss with Rybelsus.  Will keep a watch on her cholesterol but she may no longer need the Zetia if she continues to lose weight.      Will arrange for repeat echo, otherwise RTC in 6 months.  May need to consider ARB if EF remains low    Vega Javier MD  [facility]  01/16/24  13:04 EDT       Current Outpatient Medications:     atorvastatin (LIPITOR) 40 MG tablet, Take 1 tablet by mouth Every Night., Disp: 90 tablet, Rfl: 3    cholecalciferol (VITAMIN D3) 10 MCG (400 UNIT) tablet, Take 1  tablet by mouth., Disp: , Rfl:     CINNAMON PO, Take  by mouth., Disp: , Rfl:     clopidogrel (PLAVIX) 75 MG tablet, Take 1 tablet by mouth Daily., Disp: 90 tablet, Rfl: 3    clotrimazole-betamethasone (LOTRISONE) 1-0.05 % cream, 1 cream two times daily, Disp: , Rfl:     escitalopram (LEXAPRO) 10 MG tablet, Take 1 tablet by mouth Daily., Disp: 90 tablet, Rfl: 2    ezetimibe (ZETIA) 10 MG tablet, Take 1 tablet by mouth Daily., Disp: 90 tablet, Rfl: 3    Flaxseed, Linseed, (Flax Seed Oil) 1000 MG capsule, Take 1,000 mg by mouth., Disp: , Rfl:     Fluticasone-Umeclidin-Vilant (Trelegy Ellipta) 100-62.5-25 MCG/ACT inhaler, Inhale 1 puff., Disp: , Rfl:     Magnesium 250 MG tablet, Take  by mouth., Disp: , Rfl:     methylPREDNISolone (MEDROL) 4 MG dose pack, Take as directed on package instructions., Disp: 21 tablet, Rfl: 0    metoprolol succinate XL (TOPROL-XL) 50 MG 24 hr tablet, Take 1 tablet by mouth Daily., Disp: 90 tablet, Rfl: 3    nitroglycerin (NITROSTAT) 0.4 MG SL tablet, Place 1 tablet under the tongue Every 5 (Five) Minutes As Needed for Chest Pain. Take no more than 3 doses in 15 minutes., Disp: 30 tablet, Rfl: 11    Semaglutide (Rybelsus) 14 MG tablet, Take 1 tablet by mouth Daily., Disp: 30 tablet, Rfl: 2    vitamin C (ASCORBIC ACID) 250 MG tablet, Take 2 tablets by mouth Daily., Disp: , Rfl:          Return in about 6 months (around 7/16/2024).      Part of this note may be an electronic transcription/translation of spoken language to printed text using the Dragon Dictation System.

## 2024-01-16 NOTE — PATIENT INSTRUCTIONS
If you start to notice that the brusiing is annoying or bleeding becomes more frequent, we can stop the plavix and go on aspirin 81 alone. But I would continue on the plavix/clopidogrel alone for now

## 2024-01-25 ENCOUNTER — HOSPITAL ENCOUNTER (OUTPATIENT)
Dept: CARDIOLOGY | Facility: HOSPITAL | Age: 74
Discharge: HOME | End: 2024-01-25
Admitting: STUDENT IN AN ORGANIZED HEALTH CARE EDUCATION/TRAINING PROGRAM
Payer: MEDICARE

## 2024-01-25 VITALS — HEIGHT: 63 IN | BODY MASS INDEX: 35.08 KG/M2 | WEIGHT: 198 LBS

## 2024-01-25 DIAGNOSIS — R00.2 PALPITATIONS: ICD-10-CM

## 2024-01-25 DIAGNOSIS — I25.10 CAD IN NATIVE ARTERY: ICD-10-CM

## 2024-01-25 DIAGNOSIS — E78.5 HYPERLIPIDEMIA, UNSPECIFIED HYPERLIPIDEMIA TYPE: ICD-10-CM

## 2024-01-25 LAB
AORTIC DIMENSIONLESS INDEX: 0.5 (DI)
BH CV ECHO LEFT VENTRICLE GLOBAL LONGITUDINAL STRAIN: -19.7 %
BH CV ECHO MEAS - AO MAX PG: 9.9 MMHG
BH CV ECHO MEAS - AO MEAN PG: 5 MMHG
BH CV ECHO MEAS - AO ROOT DIAM: 2.7 CM
BH CV ECHO MEAS - AO V2 MAX: 157 CM/SEC
BH CV ECHO MEAS - AO V2 VTI: 32.2 CM
BH CV ECHO MEAS - AVA(I,D): 1.13 CM2
BH CV ECHO MEAS - EDV(CUBED): 103.8 ML
BH CV ECHO MEAS - EDV(MOD-SP2): 56 ML
BH CV ECHO MEAS - EDV(MOD-SP4): 63 ML
BH CV ECHO MEAS - EF(MOD-BP): 58.8 %
BH CV ECHO MEAS - EF(MOD-SP2): 58.9 %
BH CV ECHO MEAS - EF(MOD-SP4): 57.1 %
BH CV ECHO MEAS - ESV(CUBED): 48.1 ML
BH CV ECHO MEAS - ESV(MOD-SP2): 23 ML
BH CV ECHO MEAS - ESV(MOD-SP4): 27 ML
BH CV ECHO MEAS - FS: 22.6 %
BH CV ECHO MEAS - IVS/LVPW: 0.82 CM
BH CV ECHO MEAS - IVSD: 0.9 CM
BH CV ECHO MEAS - LAT PEAK E' VEL: 6.3 CM/SEC
BH CV ECHO MEAS - LV DIASTOLIC VOL/BSA (35-75): 32.7 CM2
BH CV ECHO MEAS - LV MASS(C)D: 164.5 GRAMS
BH CV ECHO MEAS - LV MAX PG: 2.26 MMHG
BH CV ECHO MEAS - LV MEAN PG: 1 MMHG
BH CV ECHO MEAS - LV SYSTOLIC VOL/BSA (12-30): 14 CM2
BH CV ECHO MEAS - LV V1 MAX: 75.2 CM/SEC
BH CV ECHO MEAS - LV V1 VTI: 14.6 CM
BH CV ECHO MEAS - LVIDD: 4.7 CM
BH CV ECHO MEAS - LVIDS: 3.6 CM
BH CV ECHO MEAS - LVOT AREA: 2.5 CM2
BH CV ECHO MEAS - LVOT DIAM: 1.79 CM
BH CV ECHO MEAS - LVPWD: 1.1 CM
BH CV ECHO MEAS - MED PEAK E' VEL: 6 CM/SEC
BH CV ECHO MEAS - MV A DUR: 0.09 SEC
BH CV ECHO MEAS - MV A MAX VEL: 119.9 CM/SEC
BH CV ECHO MEAS - MV DEC SLOPE: 276.1 CM/SEC2
BH CV ECHO MEAS - MV DEC TIME: 164 SEC
BH CV ECHO MEAS - MV E MAX VEL: 69.4 CM/SEC
BH CV ECHO MEAS - MV E/A: 0.58
BH CV ECHO MEAS - MV MAX PG: 6 MMHG
BH CV ECHO MEAS - MV MEAN PG: 1.91 MMHG
BH CV ECHO MEAS - MV P1/2T: 68.1 MSEC
BH CV ECHO MEAS - MV V2 VTI: 20.3 CM
BH CV ECHO MEAS - MVA(P1/2T): 3.2 CM2
BH CV ECHO MEAS - MVA(VTI): 1.8 CM2
BH CV ECHO MEAS - PA ACC TIME: 0.1 SEC
BH CV ECHO MEAS - PA V2 MAX: 73.9 CM/SEC
BH CV ECHO MEAS - PULM A REVS DUR: 0.13 SEC
BH CV ECHO MEAS - PULM A REVS VEL: 37.2 CM/SEC
BH CV ECHO MEAS - PULM DIAS VEL: 56.8 CM/SEC
BH CV ECHO MEAS - PULM S/D: 1.12
BH CV ECHO MEAS - PULM SYS VEL: 63.3 CM/SEC
BH CV ECHO MEAS - RAP SYSTOLE: 3 MMHG
BH CV ECHO MEAS - RV MAX PG: 2.31 MMHG
BH CV ECHO MEAS - RV V1 MAX: 76 CM/SEC
BH CV ECHO MEAS - RV V1 VTI: 15.3 CM
BH CV ECHO MEAS - RVSP: 22.3 MMHG
BH CV ECHO MEAS - SI(MOD-SP2): 17.1 ML/M2
BH CV ECHO MEAS - SI(MOD-SP4): 18.7 ML/M2
BH CV ECHO MEAS - SV(LVOT): 36.5 ML
BH CV ECHO MEAS - SV(MOD-SP2): 33 ML
BH CV ECHO MEAS - SV(MOD-SP4): 36 ML
BH CV ECHO MEAS - TAPSE (>1.6): 2.5 CM
BH CV ECHO MEAS - TR MAX PG: 19.3 MMHG
BH CV ECHO MEAS - TR MAX VEL: 219.6 CM/SEC
BH CV ECHO MEASUREMENTS AVERAGE E/E' RATIO: 11.28
BH CV XLRA - RV BASE: 3.3 CM
BH CV XLRA - RV LENGTH: 7.6 CM
BH CV XLRA - RV MID: 3 CM
BH CV XLRA - TDI S': 16.1 CM/SEC
LEFT ATRIUM VOLUME INDEX: 23.6 ML/M2

## 2024-01-25 PROCEDURE — 93356 MYOCRD STRAIN IMG SPCKL TRCK: CPT

## 2024-01-25 PROCEDURE — 93306 TTE W/DOPPLER COMPLETE: CPT

## 2024-01-25 NOTE — PROGRESS NOTES
Can we please call Ms. Doss and let her know the good news that her heart ultrasound shows that her heart muscle has recovered its function.  It is back to normal.  Her ejection fraction is 55% which is normal.  Her heart is in excellent shape and is working normally.  I do not think she needs any changes in her medical therapy right now.

## 2024-01-30 ENCOUNTER — HOSPITAL ENCOUNTER (OUTPATIENT)
Dept: MAMMOGRAPHY | Facility: HOSPITAL | Age: 74
Discharge: HOME | End: 2024-01-30
Admitting: STUDENT IN AN ORGANIZED HEALTH CARE EDUCATION/TRAINING PROGRAM
Payer: MEDICARE

## 2024-01-30 PROCEDURE — 77067 SCR MAMMO BI INCL CAD: CPT

## 2024-01-30 PROCEDURE — 77063 BREAST TOMOSYNTHESIS BI: CPT

## 2024-03-04 DIAGNOSIS — R73.03 PREDIABETES: ICD-10-CM

## 2024-03-04 RX ORDER — ORAL SEMAGLUTIDE 14 MG/1
14 TABLET ORAL DAILY
Qty: 90 TABLET | Refills: 0 | Status: SHIPPED | OUTPATIENT
Start: 2024-03-04

## 2024-03-04 NOTE — TELEPHONE ENCOUNTER
Caller: RomarioEdilma    Relationship: Self    Best call back number: [phone]    Requested Prescriptions:   Requested Prescriptions     Pending Prescriptions Disp Refills    Semaglutide (Rybelsus) 14 MG tablet 30 tablet 2     Sig: Take 1 tablet by mouth Daily.        Pharmacy where request should be sent: [facility] ([facility]) - [place] [address] [facility] [phone] [facility] [phone]      Last office visit with prescribing clinician: 12/29/2023   Last telemedicine visit with prescribing clinician: Visit date not found   Next office visit with prescribing clinician: 7/1/2024     Additional details provided by patient: WOULD LIKE A 90 DAYS SUPPLY    Does the patient have less than a 3 day supply:  [x] Yes  [] No    Would you like a call back once the refill request has been completed: [] Yes [x] No    If the office needs to give you a call back, can they leave a voicemail: [] Yes [x] No    Kaykay Hairston Rep   03/04/24 11:22 EST

## 2024-03-05 ENCOUNTER — OFFICE VISIT (OUTPATIENT)
Dept: ORTHOPEDIC SURGERY | Facility: CLINIC | Age: 74
End: 2024-03-05
Payer: MEDICARE

## 2024-03-05 VITALS — BODY MASS INDEX: 33.73 KG/M2 | WEIGHT: 190.4 LBS | HEIGHT: 63 IN

## 2024-03-05 DIAGNOSIS — M17.0 BILATERAL PRIMARY OSTEOARTHRITIS OF KNEE: Primary | ICD-10-CM

## 2024-03-05 PROCEDURE — 99213 OFFICE O/P EST LOW 20 MIN: CPT | Performed by: INTERNAL MEDICINE

## 2024-03-05 PROCEDURE — 20610 DRAIN/INJ JOINT/BURSA W/O US: CPT | Performed by: INTERNAL MEDICINE

## 2024-03-05 PROCEDURE — 73564 X-RAY EXAM KNEE 4 OR MORE: CPT | Performed by: INTERNAL MEDICINE

## 2024-03-05 RX ORDER — TRIAMCINOLONE ACETONIDE 40 MG/ML
80 INJECTION, SUSPENSION INTRA-ARTICULAR; INTRAMUSCULAR
Status: COMPLETED | OUTPATIENT
Start: 2024-03-05 | End: 2024-03-05

## 2024-03-05 RX ORDER — LIDOCAINE HYDROCHLORIDE 10 MG/ML
4 INJECTION, SOLUTION EPIDURAL; INFILTRATION; INTRACAUDAL; PERINEURAL
Status: COMPLETED | OUTPATIENT
Start: 2024-03-05 | End: 2024-03-05

## 2024-03-05 RX ADMIN — LIDOCAINE HYDROCHLORIDE 4 ML: 10 INJECTION, SOLUTION EPIDURAL; INFILTRATION; INTRACAUDAL; PERINEURAL at 13:42

## 2024-03-05 RX ADMIN — TRIAMCINOLONE ACETONIDE 80 MG: 40 INJECTION, SUSPENSION INTRA-ARTICULAR; INTRAMUSCULAR at 13:42

## 2024-03-05 NOTE — PROGRESS NOTES
Subjective:     Patient ID: Edilma Doss is a 73 y.o. female.    Chief Complaint:    History of Present Illness  Edilma Doss returns to clinic today for evaluation of bilateral left greater than right knee pain.  She states that the pain is worse on the left than the right.  She states that she fell back in January when it was slippery and had a big bruise which has slowly resolved.  She is still hopeful to avoid surgery and states that the last injection given in October lasted up till this point.  She states she thought she could have potentially going longer between injections if she had not fallen.  She would like to have bilateral knee injections performed today and is still hopeful to avoid knee replacement surgery.     Social History     Occupational History    Not on file   Tobacco Use    Smoking status: Former     Current packs/day: 0.00     Average packs/day: 2.0 packs/day for 32.9 years (65.9 ttl pk-yrs)     Types: Cigarettes     Start date: 1966     Quit date: 11/10/1999     Years since quittin.3     Passive exposure: Past    Smokeless tobacco: Never   Vaping Use    Vaping status: Never Used   Substance and Sexual Activity    Alcohol use: Never    Drug use: Never    Sexual activity: Not Currently     Partners: Male     Birth control/protection: Tubal ligation      Past Medical History:   Diagnosis Date    Allergic     Anxiety     Bursitis of hip hurts when walking and sleeping on side.    COPD (chronic obstructive pulmonary disease)     CTS (carpal tunnel syndrome) yes,  ?    Depression     Frozen shoulder     Hip arthrosis 1122/    pain when walking    Knee sprain twisted it Sore    Knee swelling     Obesity     Periarthritis of shoulder grinds    Tear of meniscus of knee left knee, 5yrs. ago now right knee I think.     Past Surgical History:   Procedure Laterality Date    CHOLECYSTECTOMY      COLONOSCOPY      CORONARY STENT PLACEMENT      GALLBLADDER SURGERY      HAND SURGERY   "both thumb joints from repitation movements    SHOULDER SURGERY      TONSILLECTOMY      TRIGGER POINT INJECTION  knleft knee    TUBAL ABDOMINAL LIGATION         Family History   Problem Relation Age of Onset    Diabetes Mother     Breast cancer Father     Cancer Sister     Breast cancer Maternal Aunt                  Objective:  Vitals:    24 1326   Weight: 86.4 kg (190 lb 6.4 oz)   Height: 160 cm (63\")         24  1326   Weight: 86.4 kg (190 lb 6.4 oz)     Body mass index is 33.73 kg/m².        Right Knee Exam     Tenderness   The patient is experiencing tenderness in the medial retinaculum, medial joint line and patella.    Range of Motion   Extension:  0   Flexion:  110     Tests   Varus: negative Valgus: negative  Lachman:  Anterior - negative    Posterior - negative  Drawer:  Anterior - negative    Posterior - negative    Other   Erythema: absent  Scars: absent  Sensation: normal  Pulse: present  Swelling: none  Effusion: no effusion present      Left Knee Exam     Tenderness   The patient is experiencing tenderness in the medial retinaculum, medial joint line and patella.    Range of Motion   Extension:  5   Flexion:  110     Tests   Varus: negative Valgus: negative  Lachman:  Anterior - negative    Posterior - negative  Drawer:  Anterior - negative     Posterior - negative    Other   Erythema: absent  Scars: absent  Sensation: normal  Pulse: present  Swelling: none  Effusion: no effusion present               Imagin views of the bilateral knee were ordered and reviewed by myself in the office today  Indication: bilateral knee pain  Findings: X-rays demonstrate no acute osseous abnormality.  There is no signs of fracture dislocation or subluxation.  There is joint space narrowing, subchondral sclerosis, cystic changes, and periarticular osteophytes most pronounced in the Medial.  Comparative studies: None      Assessment:        1. Bilateral primary osteoarthritis of knee           Plan:  - " Large Joint Arthrocentesis: bilateral knee on 3/5/2024 1:42 PM  Indications: pain  Details: 22 G needle, anterolateral approach  Medications (Right): 80 mg triamcinolone acetonide 40 MG/ML; 4 mL lidocaine PF 1% 1 %  Medications (Left): 80 mg triamcinolone acetonide 40 MG/ML; 4 mL lidocaine PF 1% 1 %  Outcome: tolerated well, no immediate complications  Procedure, treatment alternatives, risks and benefits explained, specific risks discussed. Consent was given by the patient. Immediately prior to procedure a time out was called to verify the correct patient, procedure, equipment, support staff and site/side marked as required. Patient was prepped and draped in the usual sterile fashion.                 Discussed treatment options at length with patient at today's visit.  I discussed the patient that as long as her pain is controlled with injections and conservative treatments we can continue that for as long as she would like. I discussed with the patient that the mainstays of conservative treatment for knee OA include physical therapy, nonsteroidal anti-inflammatories, injections, activity modification, and home exercises.  The patient states that they  would like to try another round of bilateral knee intra-articular corticosteroid injections.  At this point time the patient does not need to schedule follow-up with me today.  I am happy to see the patient back at any point time however if issues arise or they fail to make a full recovery.  Follow up: As needed without x-rays for an injection visit but not before 6/5/2024      Edilma Doss was in agreement with plan and had all questions answered.     Medications:  No orders of the defined types were placed in this encounter.      Followup:  No follow-ups on file.    Diagnoses and all orders for this visit:    1. Bilateral primary osteoarthritis of knee (Primary)  -     XR Knee 4+ View Bilateral  -     - Large Joint Arthrocentesis: bilateral  knee          Dictated utilizing Dragon dictation

## 2024-03-06 ENCOUNTER — TELEPHONE (OUTPATIENT)
Dept: FAMILY MEDICINE CLINIC | Facility: CLINIC | Age: 74
End: 2024-03-06
Payer: MEDICARE

## 2024-03-06 NOTE — TELEPHONE ENCOUNTER
"    Caller: Edilma Doss \"ALIA\"    Relationship: Self    Best call back number:     Caller requesting test results:     What test was performed: XRAY OF KNEES    When was the test performed: 03/05/24    Where was the test performed: [facility]    Additional notes: PATIENT IS CALLING IN TO SEE IF DR MARIEE HAS HER XRAY RESULTS IN YET.  SHE WANTS TO BE CALLED TO DISCUSS.       "

## 2024-03-24 DIAGNOSIS — R73.03 PREDIABETES: ICD-10-CM

## 2024-03-25 RX ORDER — ORAL SEMAGLUTIDE 14 MG/1
1 TABLET ORAL DAILY
Qty: 30 TABLET | Refills: 2 | Status: SHIPPED | OUTPATIENT
Start: 2024-03-25 | End: 2024-03-27 | Stop reason: SDUPTHER

## 2024-03-27 ENCOUNTER — TELEPHONE (OUTPATIENT)
Dept: FAMILY MEDICINE CLINIC | Facility: CLINIC | Age: 74
End: 2024-03-27
Payer: MEDICARE

## 2024-03-27 DIAGNOSIS — R73.03 PREDIABETES: ICD-10-CM

## 2024-03-27 RX ORDER — ORAL SEMAGLUTIDE 14 MG/1
1 TABLET ORAL DAILY
Qty: 90 TABLET | Refills: 0 | Status: SHIPPED | OUTPATIENT
Start: 2024-03-27

## 2024-03-27 NOTE — TELEPHONE ENCOUNTER
"Caller: Edilma Doss \"ALIA\"    Relationship: Self    Best call back number: [phone]     What medication are you requesting: Semaglutide (Rybelsus) 14 MG tablet     If a prescription is needed, what is your preferred pharmacy and phone number: [facility] ([facility]) - [place], [place] - [address] [facility] [phone] [facility] [phone] [facility]     Additional notes: PATIENT REQUESTS TO HAVE PRESCRIPTION TRANSFERRED TO [facility]        "

## 2024-04-19 ENCOUNTER — OFFICE VISIT (OUTPATIENT)
Dept: FAMILY MEDICINE CLINIC | Facility: CLINIC | Age: 74
End: 2024-04-19
Payer: MEDICARE

## 2024-04-19 VITALS
WEIGHT: 180 LBS | OXYGEN SATURATION: 94 % | HEIGHT: 63 IN | HEART RATE: 62 BPM | DIASTOLIC BLOOD PRESSURE: 78 MMHG | RESPIRATION RATE: 16 BRPM | TEMPERATURE: 97.7 F | SYSTOLIC BLOOD PRESSURE: 130 MMHG | BODY MASS INDEX: 31.89 KG/M2

## 2024-04-19 DIAGNOSIS — J98.8 RESPIRATORY INFECTION: ICD-10-CM

## 2024-04-19 DIAGNOSIS — J40 BRONCHITIS: Primary | ICD-10-CM

## 2024-04-19 PROCEDURE — 3078F DIAST BP <80 MM HG: CPT | Performed by: STUDENT IN AN ORGANIZED HEALTH CARE EDUCATION/TRAINING PROGRAM

## 2024-04-19 PROCEDURE — 3075F SYST BP GE 130 - 139MM HG: CPT | Performed by: STUDENT IN AN ORGANIZED HEALTH CARE EDUCATION/TRAINING PROGRAM

## 2024-04-19 PROCEDURE — 99213 OFFICE O/P EST LOW 20 MIN: CPT | Performed by: STUDENT IN AN ORGANIZED HEALTH CARE EDUCATION/TRAINING PROGRAM

## 2024-04-19 RX ORDER — AMOXICILLIN AND CLAVULANATE POTASSIUM 875; 125 MG/1; MG/1
1 TABLET, FILM COATED ORAL 2 TIMES DAILY
Qty: 14 TABLET | Refills: 0 | Status: SHIPPED | OUTPATIENT
Start: 2024-04-19 | End: 2024-04-26

## 2024-04-19 RX ORDER — METHYLPREDNISOLONE 4 MG/1
TABLET ORAL
Qty: 21 TABLET | Refills: 0 | Status: SHIPPED | OUTPATIENT
Start: 2024-04-19 | End: 2024-05-08

## 2024-05-08 ENCOUNTER — OFFICE VISIT (OUTPATIENT)
Dept: FAMILY MEDICINE CLINIC | Facility: CLINIC | Age: 74
End: 2024-05-08
Payer: MEDICARE

## 2024-05-08 VITALS
RESPIRATION RATE: 16 BRPM | BODY MASS INDEX: 31.71 KG/M2 | OXYGEN SATURATION: 100 % | HEART RATE: 72 BPM | WEIGHT: 179 LBS | SYSTOLIC BLOOD PRESSURE: 130 MMHG | DIASTOLIC BLOOD PRESSURE: 78 MMHG | HEIGHT: 63 IN

## 2024-05-08 DIAGNOSIS — J32.9 RHINOSINUSITIS: Primary | ICD-10-CM

## 2024-05-08 PROCEDURE — 99213 OFFICE O/P EST LOW 20 MIN: CPT | Performed by: STUDENT IN AN ORGANIZED HEALTH CARE EDUCATION/TRAINING PROGRAM

## 2024-05-08 PROCEDURE — 1159F MED LIST DOCD IN RCRD: CPT | Performed by: STUDENT IN AN ORGANIZED HEALTH CARE EDUCATION/TRAINING PROGRAM

## 2024-05-08 PROCEDURE — 3078F DIAST BP <80 MM HG: CPT | Performed by: STUDENT IN AN ORGANIZED HEALTH CARE EDUCATION/TRAINING PROGRAM

## 2024-05-08 PROCEDURE — 3075F SYST BP GE 130 - 139MM HG: CPT | Performed by: STUDENT IN AN ORGANIZED HEALTH CARE EDUCATION/TRAINING PROGRAM

## 2024-05-08 PROCEDURE — 1160F RVW MEDS BY RX/DR IN RCRD: CPT | Performed by: STUDENT IN AN ORGANIZED HEALTH CARE EDUCATION/TRAINING PROGRAM

## 2024-05-08 PROCEDURE — 1125F AMNT PAIN NOTED PAIN PRSNT: CPT | Performed by: STUDENT IN AN ORGANIZED HEALTH CARE EDUCATION/TRAINING PROGRAM

## 2024-05-08 RX ORDER — PREDNISONE 20 MG/1
TABLET ORAL
Qty: 19 TABLET | Refills: 0 | Status: SHIPPED | OUTPATIENT
Start: 2024-05-08 | End: 2024-05-18

## 2024-05-25 NOTE — PROGRESS NOTES
Ferny Mosley,   [facility] PRIMARY CARE  [address]  [place] [id number] [phone]    Subjective      Name Edilma Doss MRN [id number]    1950 AGE/SEX 73 y.o. / female      Chief Complaint Chief Complaint   Patient presents with    URI     Was treated for bronchitis a few weeks ago and finished abx, has not regained sense of smell. Still has head congestion and slight cough. Has been going on for 2-3 weeks and decided it time to get checked out again          Visit History for  2024    History of Present Illness  Edilma Doss is a 73 y.o. female who presented today for URI (Was treated for bronchitis a few weeks ago and finished abx, has not regained sense of smell. Still has head congestion and slight cough. Has been going on for 2-3 weeks and decided it time to get checked out again )      The patient reports a loss of taste and smell for the past 3 weeks, accompanied by a sensation of tenderness in her throat and significant pressure in her head. Despite the absence of a lingering cough, she continues to experience nasal congestion and rhinorrhea. Her treatment regimen for pneumonia was successfully completed, however, upon completion of the antibiotic course, her olfactory and gustatory senses returned. She tested negative for COVID-19 for the 2nd day. Her current treatment regimen includes the use of Vicks nasal spray and generic Zyrtec, although she does not take it daily. She also uses a neti pot every morning or every other day, although she has not used it in the past few days due to the head pressure. She continues to perform nasal irrigation.        Medications and Allergies   Current Outpatient Medications   Medication Instructions    atorvastatin (LIPITOR) 40 mg, Oral, Nightly    cholecalciferol (VITAMIN D3) 400 Units, Oral    CINNAMON PO Oral    clopidogrel (PLAVIX) 75 mg, Oral, Daily    clotrimazole-betamethasone (LOTRISONE) 1-0.05 %  "cream 1 cream two times daily    escitalopram (LEXAPRO) 10 mg, Oral, Daily    ezetimibe (ZETIA) 10 mg, Oral, Daily    Flax Seed Oil 1,000 mg, Oral    Fluticasone-Umeclidin-Vilant (Trelegy Ellipta) 100-62.5-25 MCG/ACT inhaler 1 puff, Inhalation    Magnesium 250 MG tablet Oral    metoprolol succinate XL (TOPROL-XL) 50 mg, Oral, Daily    nitroglycerin (NITROSTAT) 0.4 mg, Sublingual, Every 5 Minutes PRN, Take no more than 3 doses in 15 minutes.    Rybelsus 14 mg, Oral, Daily    vitamin C (ASCORBIC ACID) 500 mg, Oral, Daily     No Known Allergies   I have reviewed the above medications and allergies     Objective:      Vitals Vitals:    05/08/24 1210   BP: 130/78   BP Location: Left arm   Patient Position: Sitting   Cuff Size: Adult   Pulse: 72   Resp: 16   SpO2: 100%   Weight: 81.2 kg (179 lb)   Height: 160 cm (63\")     Body mass index is 31.71 kg/m².    Physical Exam  Vitals reviewed.   Constitutional:       General: She is not in acute distress.     Appearance: She is not ill-appearing.   HENT:      Right Ear: Tympanic membrane, ear canal and external ear normal.      Left Ear: Tympanic membrane, ear canal and external ear normal.      Nose: Congestion and rhinorrhea present.      Mouth/Throat:      Pharynx: Posterior oropharyngeal erythema present.   Cardiovascular:      Rate and Rhythm: Normal rate and regular rhythm.   Pulmonary:      Effort: Pulmonary effort is normal.      Breath sounds: Normal breath sounds.   Psychiatric:         Mood and Affect: Mood normal.         Behavior: Behavior normal.         Thought Content: Thought content normal.         Judgment: Judgment normal.       Physical Exam     Results          Assessment/Plan      Issues Addressed/ Plan   Diagnosis Plan   1. Rhinosinusitis  predniSONE (DELTASONE) 20 MG tablet         Assessment & Plan  1. Anosmia and ageusia.  The patient was informed that her olfactory and gustatory senses should gradually reappear, albeit with a period of dryness before " noticeably reappear. The possibility of early satiety due to nocturnal postnasal drip was also discussed. A tapering course of prednisone will be initiated for a duration of 10 days. Additionally, montelukast will be prescribed. The patient is advised to take Zyrtec daily until she feels fully recovered, after which she can discontinue its use. She is also advised to continue nasal irrigation.           There are no Patient Instructions on file for this visit.        Follow up  recommended Return if symptoms worsen or fail to improve.   - Dragon voice recognition software was utilized to complete this chart.  Every reasonable attempt was made to edit and correct the text, however some incorrect words may remain.   Ferny Mosley DO    Patient or patient representative verbalized consent for the use of Ambient Listening during the visit with  Ferny Mosley DO for chart documentation. 5/25/2024  01:26 EDT

## 2024-06-06 ENCOUNTER — TELEPHONE (OUTPATIENT)
Age: 74
End: 2024-06-06
Payer: MEDICARE

## 2024-06-06 RX ORDER — ASPIRIN 81 MG/1
81 TABLET ORAL DAILY
Qty: 90 TABLET | Refills: 3 | Status: SHIPPED | OUTPATIENT
Start: 2024-06-06 | End: 2025-06-06

## 2024-06-06 NOTE — TELEPHONE ENCOUNTER
If she continues to have significant arm bruising then she can stop taking the Plavix medication and were going to replace it with aspirin 81 mg a day.  I have changed her chart to reflect this.

## 2024-06-06 NOTE — TELEPHONE ENCOUNTER
Pt is calling to let you know that she is still bruising on her arm. She was told to call you if it continues and maybe you would change her medicine?    PT #: [phone]

## 2024-07-01 ENCOUNTER — OFFICE VISIT (OUTPATIENT)
Dept: FAMILY MEDICINE CLINIC | Facility: CLINIC | Age: 74
End: 2024-07-01
Payer: MEDICARE

## 2024-07-01 VITALS
OXYGEN SATURATION: 99 % | SYSTOLIC BLOOD PRESSURE: 120 MMHG | HEART RATE: 70 BPM | RESPIRATION RATE: 18 BRPM | DIASTOLIC BLOOD PRESSURE: 68 MMHG | HEIGHT: 63 IN | BODY MASS INDEX: 32.25 KG/M2 | WEIGHT: 182 LBS

## 2024-07-01 DIAGNOSIS — R73.03 PREDIABETES: ICD-10-CM

## 2024-07-01 DIAGNOSIS — I10 HTN (HYPERTENSION), BENIGN: ICD-10-CM

## 2024-07-01 DIAGNOSIS — E78.5 DYSLIPIDEMIA, GOAL LDL BELOW 70: ICD-10-CM

## 2024-07-01 DIAGNOSIS — D64.9 ANEMIA, UNSPECIFIED TYPE: ICD-10-CM

## 2024-07-01 DIAGNOSIS — Z00.00 MEDICARE ANNUAL WELLNESS VISIT, SUBSEQUENT: Primary | ICD-10-CM

## 2024-07-01 PROCEDURE — 3078F DIAST BP <80 MM HG: CPT | Performed by: STUDENT IN AN ORGANIZED HEALTH CARE EDUCATION/TRAINING PROGRAM

## 2024-07-01 PROCEDURE — 36415 COLL VENOUS BLD VENIPUNCTURE: CPT | Performed by: STUDENT IN AN ORGANIZED HEALTH CARE EDUCATION/TRAINING PROGRAM

## 2024-07-01 PROCEDURE — 1125F AMNT PAIN NOTED PAIN PRSNT: CPT | Performed by: STUDENT IN AN ORGANIZED HEALTH CARE EDUCATION/TRAINING PROGRAM

## 2024-07-01 PROCEDURE — G0439 PPPS, SUBSEQ VISIT: HCPCS | Performed by: STUDENT IN AN ORGANIZED HEALTH CARE EDUCATION/TRAINING PROGRAM

## 2024-07-01 PROCEDURE — 3074F SYST BP LT 130 MM HG: CPT | Performed by: STUDENT IN AN ORGANIZED HEALTH CARE EDUCATION/TRAINING PROGRAM

## 2024-07-01 NOTE — PROGRESS NOTES
The ABCs of the Annual Wellness Visit  Subsequent Medicare Wellness Visit    Chief Complaint   Patient presents with    Medicare Wellness-subsequent      Subjective    History of Present Illness:  Edilma Doss is a 73 y.o. female who presents for a Subsequent Medicare Wellness Visit.    The following portions of the patient's history were reviewed and   updated as appropriate: allergies, current medications, past family history, past medical history, past social history, past surgical history, and problem list.    Compared to one year ago, the patient feels her physical   health is the same.    Compared to one year ago, the patient feels her mental   health is better.  Has been around the great grand kids and loves it.      Recent Hospitalizations:  She was not admitted to the hospital during the last year.       Current Medical Providers:  Patient Care Team:  Ferny Mosley DO as PCP - General (Family Medicine)    Outpatient Medications Prior to Visit   Medication Sig Dispense Refill    aspirin 81 MG EC tablet Take 1 tablet by mouth Daily. 90 tablet 3    atorvastatin (LIPITOR) 40 MG tablet Take 1 tablet by mouth Every Night. 90 tablet 3    cholecalciferol (VITAMIN D3) 10 MCG (400 UNIT) tablet Take 1 tablet by mouth.      CINNAMON PO Take  by mouth.      clotrimazole-betamethasone (LOTRISONE) 1-0.05 % cream 1 cream two times daily      escitalopram (LEXAPRO) 10 MG tablet Take 1 tablet by mouth Daily. 90 tablet 2    ezetimibe (ZETIA) 10 MG tablet Take 1 tablet by mouth Daily. 90 tablet 3    Flaxseed, Linseed, (Flax Seed Oil) 1000 MG capsule Take 1,000 mg by mouth.      Fluticasone-Umeclidin-Vilant (Trelegy Ellipta) 100-62.5-25 MCG/ACT inhaler Inhale 1 puff.      Magnesium 250 MG tablet Take  by mouth.      metoprolol succinate XL (TOPROL-XL) 50 MG 24 hr tablet Take 1 tablet by mouth Daily. 90 tablet 3    Semaglutide (Rybelsus) 14 MG tablet Take 1 tablet by mouth Daily. 90 tablet 0    vitamin C (ASCORBIC ACID)  "250 MG tablet Take 2 tablets by mouth Daily.      nitroglycerin (NITROSTAT) 0.4 MG SL tablet Place 1 tablet under the tongue Every 5 (Five) Minutes As Needed for Chest Pain. Take no more than 3 doses in 15 minutes. 30 tablet 11     No facility-administered medications prior to visit.       No opioid medication identified on active medication list. I have reviewed chart for other potential  high risk medication/s and harmful drug interactions in the elderly.        Aspirin is on active medication list. Aspirin use is indicated based on review of current medical condition/s. Pros and cons of this therapy have been discussed today. Benefits of this medication outweigh potential harm.  Patient has been encouraged to continue taking this medication.  .      Patient Active Problem List   Diagnosis    CAD in native artery    COPD (chronic obstructive pulmonary disease)    Depression    Dyslipidemia, goal LDL below 70    History of tobacco abuse    HTN (hypertension), benign    Morbid obesity with BMI of 40.0-44.9, adult    Obstructive sleep apnea on CPAP    PAD (peripheral artery disease)    Prediabetes    OA (osteoarthritis) of knee     Advance Care Planning  Advance Directive is not on file.  ACP discussion was held with the patient during this visit. Patient does not have an advance directive, information provided.          Objective    Vitals:    07/01/24 1400   BP: 120/68   BP Location: Left arm   Patient Position: Sitting   Cuff Size: Adult   Pulse: 70   Resp: 18   SpO2: 99%   Weight: 82.6 kg (182 lb)   Height: 160 cm (63\")     Estimated body mass index is 32.24 kg/m² as calculated from the following:    Height as of this encounter: 160 cm (63\").    Weight as of this encounter: 82.6 kg (182 lb).       Does the patient have evidence of cognitive impairment? No, Mini Mental exam is normal.    Physical Exam  Vitals reviewed.   Constitutional:       General: She is not in acute distress.     Appearance: She is not " ill-appearing.   HENT:      Right Ear: Tympanic membrane, ear canal and external ear normal.      Left Ear: Tympanic membrane, ear canal and external ear normal.   Cardiovascular:      Rate and Rhythm: Normal rate and regular rhythm.   Pulmonary:      Effort: Pulmonary effort is normal.      Breath sounds: Normal breath sounds.   Neurological:      Mental Status: She is alert.   Psychiatric:         Mood and Affect: Mood normal.         Behavior: Behavior normal.         Thought Content: Thought content normal.         Judgment: Judgment normal.       Lab Results   Component Value Date    CHLPL 124 2024    TRIG 130 2024    HDL 57 2024    LDL 44 2024    VLDL 23 2024    HGBA1C 5.7 (H) 2024            HEALTH RISK ASSESSMENT    Smoking Status:  Social History     Tobacco Use   Smoking Status Former    Current packs/day: 0.00    Average packs/day: 2.0 packs/day for 32.9 years (65.9 ttl pk-yrs)    Types: Cigarettes    Start date: 1966    Quit date: 11/10/1999    Years since quittin.6    Passive exposure: Past   Smokeless Tobacco Never     Alcohol Consumption:  Social History     Substance and Sexual Activity   Alcohol Use Never     Fall Risk Screen:    STEADI Fall Risk Assessment was completed, and patient is at LOW risk for falls.Assessment completed on:2024    Depression Screenin/1/2024     2:06 PM   PHQ-2/PHQ-9 Depression Screening   Little Interest or Pleasure in Doing Things 0-->not at all   Feeling Down, Depressed or Hopeless 0-->not at all   PHQ-9: Brief Depression Severity Measure Score 0       Health Habits and Functional and Cognitive Screenin/1/2024     2:03 PM   Functional & Cognitive Status   Do you have difficulty preparing food and eating? No   Do you have difficulty bathing yourself, getting dressed or grooming yourself? No   Do you have difficulty using the toilet? No   Do you have difficulty moving around from place to place? No   Do you  have trouble with steps or getting out of a bed or a chair? No   Current Diet Well Balanced Diet   Dental Exam Not up to date   Eye Exam Up to date   Exercise (times per week) 7 times per week   Current Exercises Include Yard Work;House Cleaning   Do you need help using the phone?  No   Are you deaf or do you have serious difficulty hearing?  No   Do you need help to go to places out of walking distance? No   Do you need help shopping? No   Do you need help preparing meals?  No   Do you need help with housework?  No   Do you need help with laundry? No   Do you need help taking your medications? No   Do you need help managing money? No   Do you ever drive or ride in a car without wearing a seat belt? No   Have you felt unusual stress, anger or loneliness in the last month? No   Who do you live with? Spouse   If you need help, do you have trouble finding someone available to you? No   Do you have difficulty concentrating, remembering or making decisions? No       Age-appropriate Screening Schedule:  Refer to the list below for future screening recommendations based on patient's age, sex and/or medical conditions. Orders for these recommended tests are listed in the plan section. The patient has been provided with a written plan.    Health Maintenance   Topic Date Due    TDAP/TD VACCINES (1 - Tdap) Never done    HEPATITIS C SCREENING  Never done    ANNUAL WELLNESS VISIT  Never done    COVID-19 Vaccine (6 - 2023-24 season) 09/01/2023    INFLUENZA VACCINE  08/01/2024    BMI FOLLOWUP  03/27/2025    LIPID PANEL  07/01/2025    DXA SCAN  07/13/2025    COLORECTAL CANCER SCREENING  06/14/2032    RSV Vaccine - Adults  Completed    Pneumococcal Vaccine 65+  Completed    ZOSTER VACCINE  Completed          Assessment & Plan   CMS Preventative Services Quick Reference  Risk Factors Identified During Encounter  None Identified  The above risks/problems have been discussed with the patient.  Follow up actions/plans if indicated are  seen below in the Assessment/Plan Section.  Pertinent information has been shared with the patient in the After Visit Summary.    Diagnoses and all orders for this visit:    1. Medicare annual wellness visit, subsequent (Primary)    2. Dyslipidemia, goal LDL below 70  -     Lipid panel    3. HTN (hypertension), benign  -     Comprehensive metabolic panel    4. Prediabetes  -     Hemoglobin A1c    5. Anemia, unspecified type  -     CBC w AUTO Differential        Follow Up:   Return in about 3 months (around 10/1/2024).     An After Visit Summary and PPPS were made available to the patient.

## 2024-07-01 NOTE — PROGRESS NOTES
Venipuncture Blood Specimen Collection  Venipuncture performed in right arm by Jocy Pool MA with good hemostasis. Patient tolerated the procedure well without complications.   07/01/24   Jocy Pool MA

## 2024-07-02 LAB
ALBUMIN SERPL-MCNC: 4.1 G/DL (ref 3.8–4.8)
ALP SERPL-CCNC: 60 IU/L (ref 44–121)
ALT SERPL-CCNC: 25 IU/L (ref 0–32)
AST SERPL-CCNC: 23 IU/L (ref 0–40)
BASOPHILS # BLD AUTO: 0 X10E3/UL (ref 0–0.2)
BASOPHILS NFR BLD AUTO: 1 %
BILIRUB SERPL-MCNC: 0.4 MG/DL (ref 0–1.2)
BUN SERPL-MCNC: 24 MG/DL (ref 8–27)
BUN/CREAT SERPL: 27 (ref 12–28)
CALCIUM SERPL-MCNC: 9.7 MG/DL (ref 8.7–10.3)
CHLORIDE SERPL-SCNC: 103 MMOL/L (ref 96–106)
CHOLEST SERPL-MCNC: 124 MG/DL (ref 100–199)
CO2 SERPL-SCNC: 24 MMOL/L (ref 20–29)
CREAT SERPL-MCNC: 0.9 MG/DL (ref 0.57–1)
EGFRCR SERPLBLD CKD-EPI 2021: 67 ML/MIN/1.73
EOSINOPHIL # BLD AUTO: 0.1 X10E3/UL (ref 0–0.4)
EOSINOPHIL NFR BLD AUTO: 1 %
ERYTHROCYTE [DISTWIDTH] IN BLOOD BY AUTOMATED COUNT: 12.6 % (ref 11.7–15.4)
GLOBULIN SER CALC-MCNC: 2.3 G/DL (ref 1.5–4.5)
GLUCOSE SERPL-MCNC: 84 MG/DL (ref 70–99)
HBA1C MFR BLD: 5.7 % (ref 4.8–5.6)
HCT VFR BLD AUTO: 40.3 % (ref 34–46.6)
HDLC SERPL-MCNC: 57 MG/DL
HGB BLD-MCNC: 13 G/DL (ref 11.1–15.9)
IMM GRANULOCYTES # BLD AUTO: 0 X10E3/UL (ref 0–0.1)
IMM GRANULOCYTES NFR BLD AUTO: 0 %
LDLC SERPL CALC-MCNC: 44 MG/DL (ref 0–99)
LYMPHOCYTES # BLD AUTO: 2.8 X10E3/UL (ref 0.7–3.1)
LYMPHOCYTES NFR BLD AUTO: 34 %
MCH RBC QN AUTO: 29.7 PG (ref 26.6–33)
MCHC RBC AUTO-ENTMCNC: 32.3 G/DL (ref 31.5–35.7)
MCV RBC AUTO: 92 FL (ref 79–97)
MONOCYTES # BLD AUTO: 0.6 X10E3/UL (ref 0.1–0.9)
MONOCYTES NFR BLD AUTO: 8 %
NEUTROPHILS # BLD AUTO: 4.7 X10E3/UL (ref 1.4–7)
NEUTROPHILS NFR BLD AUTO: 56 %
PLATELET # BLD AUTO: 213 X10E3/UL (ref 150–450)
POTASSIUM SERPL-SCNC: 4.7 MMOL/L (ref 3.5–5.2)
PROT SERPL-MCNC: 6.4 G/DL (ref 6–8.5)
RBC # BLD AUTO: 4.38 X10E6/UL (ref 3.77–5.28)
SODIUM SERPL-SCNC: 140 MMOL/L (ref 134–144)
TRIGL SERPL-MCNC: 130 MG/DL (ref 0–149)
VLDLC SERPL CALC-MCNC: 23 MG/DL (ref 5–40)
WBC # BLD AUTO: 8.3 X10E3/UL (ref 3.4–10.8)

## 2024-07-05 ENCOUNTER — OFFICE VISIT (OUTPATIENT)
Dept: ORTHOPEDIC SURGERY | Facility: CLINIC | Age: 74
End: 2024-07-05
Payer: MEDICARE

## 2024-07-05 VITALS — WEIGHT: 182 LBS | HEIGHT: 63 IN | BODY MASS INDEX: 32.25 KG/M2

## 2024-07-05 DIAGNOSIS — Z47.1 AFTERCARE FOLLOWING LEFT KNEE JOINT REPLACEMENT SURGERY: ICD-10-CM

## 2024-07-05 DIAGNOSIS — Z96.652 AFTERCARE FOLLOWING LEFT KNEE JOINT REPLACEMENT SURGERY: ICD-10-CM

## 2024-07-05 DIAGNOSIS — M17.11 PRIMARY OSTEOARTHRITIS OF RIGHT KNEE: ICD-10-CM

## 2024-07-05 DIAGNOSIS — M17.12 PRIMARY OSTEOARTHRITIS OF LEFT KNEE: Primary | ICD-10-CM

## 2024-07-05 DIAGNOSIS — R79.9 ABNORMAL FINDING OF BLOOD CHEMISTRY, UNSPECIFIED: ICD-10-CM

## 2024-07-05 PROBLEM — M17.9 OA (OSTEOARTHRITIS) OF KNEE: Status: ACTIVE | Noted: 2024-07-05

## 2024-07-05 RX ORDER — PREGABALIN 150 MG/1
150 CAPSULE ORAL ONCE
OUTPATIENT
Start: 2024-07-05 | End: 2024-07-05

## 2024-07-05 RX ORDER — CHLORHEXIDINE GLUCONATE 500 MG/1
CLOTH TOPICAL ONCE
OUTPATIENT
Start: 2024-07-05

## 2024-07-05 RX ORDER — TRIAMCINOLONE ACETONIDE 40 MG/ML
80 INJECTION, SUSPENSION INTRA-ARTICULAR; INTRAMUSCULAR
Status: COMPLETED | OUTPATIENT
Start: 2024-07-05 | End: 2024-07-05

## 2024-07-05 RX ORDER — LIDOCAINE HYDROCHLORIDE 10 MG/ML
4 INJECTION, SOLUTION EPIDURAL; INFILTRATION; INTRACAUDAL; PERINEURAL
Status: COMPLETED | OUTPATIENT
Start: 2024-07-05 | End: 2024-07-05

## 2024-07-05 RX ORDER — MELOXICAM 7.5 MG/1
15 TABLET ORAL ONCE
OUTPATIENT
Start: 2024-07-05 | End: 2024-07-05

## 2024-07-05 RX ADMIN — LIDOCAINE HYDROCHLORIDE 4 ML: 10 INJECTION, SOLUTION EPIDURAL; INFILTRATION; INTRACAUDAL; PERINEURAL at 13:18

## 2024-07-05 RX ADMIN — TRIAMCINOLONE ACETONIDE 80 MG: 40 INJECTION, SUSPENSION INTRA-ARTICULAR; INTRAMUSCULAR at 13:18

## 2024-07-05 NOTE — PROGRESS NOTES
Subjective:     Patient ID: Edilma Doss is a 73 y.o. female.    Chief Complaint:    History of Present Illness  Edilma Doss returns to clinic today for evaluation of left knee pain/OA.  She states that her left knee has become quite severe and debilitating and she would like to be considered for left total knee arthroplasty.  She states that the injections are no longer lasting like they did before.  She denies any new injury.  She states the pain is located anterior medially in her knee and is worse with activity and better with rest.  She has tried NSAIDs PT significant weight loss and corticosteroid injections.     Social History     Occupational History    Not on file   Tobacco Use    Smoking status: Former     Current packs/day: 0.00     Average packs/day: 2.0 packs/day for 32.9 years (65.9 ttl pk-yrs)     Types: Cigarettes     Start date: 1966     Quit date: 11/10/1999     Years since quittin.6     Passive exposure: Past    Smokeless tobacco: Never   Vaping Use    Vaping status: Never Used   Substance and Sexual Activity    Alcohol use: Never    Drug use: Never    Sexual activity: Not Currently     Partners: Male     Birth control/protection: Tubal ligation      Past Medical History:   Diagnosis Date    Allergic     Anxiety     Bursitis of hip hurts when walking and sleeping on side.    COPD (chronic obstructive pulmonary disease)     CTS (carpal tunnel syndrome) yes,  ?    Depression     Frozen shoulder     Hip arthrosis 1122/    pain when walking    Knee sprain twisted it Sore    Knee swelling     Obesity     Periarthritis of shoulder grinds    Tear of meniscus of knee left knee, 5yrs. ago now right knee I think.     Past Surgical History:   Procedure Laterality Date    CHOLECYSTECTOMY      COLONOSCOPY      CORONARY STENT PLACEMENT      GALLBLADDER SURGERY      HAND SURGERY  both thumb joints from repitation movements    SHOULDER SURGERY      TONSILLECTOMY      TRIGGER POINT  "INJECTION  knleft knee    TUBAL ABDOMINAL LIGATION         Family History   Problem Relation Age of Onset    Diabetes Mother     Breast cancer Father     Cancer Sister     Breast cancer Maternal Aunt                  Objective:  Vitals:    07/05/24 1252   Weight: 82.6 kg (182 lb)   Height: 160 cm (63\")         07/05/24  1252   Weight: 82.6 kg (182 lb)     Body mass index is 32.24 kg/m².        Left Knee Exam     Tenderness   The patient is experiencing tenderness in the medial retinaculum, medial joint line and medial hamstring.    Range of Motion   Extension:  5   Flexion:  120     Tests   Varus: negative Valgus: negative  Lachman:  Anterior - negative    Posterior - negative  Drawer:  Anterior - negative     Posterior - negative    Other   Erythema: absent  Scars: absent  Sensation: normal  Pulse: present  Swelling: mild  Effusion: no effusion present             Imaging: no new    Assessment:        1. Primary osteoarthritis of left knee    2. Aftercare following left knee joint replacement surgery    3. Abnormal finding of blood chemistry, unspecified           Plan:  - Large Joint Arthrocentesis: R knee on 7/5/2024 1:18 PM  Indications: pain  Details: 22 G needle, superolateral approach  Medications: 4 mL lidocaine PF 1% 1 %; 80 mg triamcinolone acetonide 40 MG/ML  Outcome: tolerated well, no immediate complications  Procedure, treatment alternatives, risks and benefits explained, specific risks discussed. Consent was given by the patient. Immediately prior to procedure a time out was called to verify the correct patient, procedure, equipment, support staff and site/side marked as required. Patient was prepped and draped in the usual sterile fashion.           She has failed conservative management of left knee osteoarthritis including knee injections, nsaids, pt, and weight loss .  We discussed further conservative treatments including but not limited to physical therapy, intra-articular injections, " nonsteroidal anti-inflammatories, topical creams, use of an assistive device, weight loss, and low impact exercises.  Shevoiced understanding of further nonoperative treatment options but She is interested in proceeding with knee replacement surgery.    The spectrum of treatment options were discussed with the patient in detail including both the nonoperative and operative treatment modalities and their respective risks and benefits.  After thorough discussion, the patient has elected to undergo surgical treatment.  The details of the surgical procedure were explained including the location of probable incisions and a description of the likely implants to be used.  Models and diagrams were used as educational resources. The patient understands the likely convalescence after surgery, as well as the rehabilitation required.  We thoroughly discussed the risks, benefits, and alternatives to surgery.  The risks include but are not limited to the risk of infection, joint stiffness, blood clots (including DVT and/or pulmonary embolus along with the risk of death), neurologic and/or vascular injury, fracture, dislocation, nonunion, malunion, need for further surgery including hardware failure requiring revision, and continued pain.  It was explained that if tissue has been repaired or reconstructed, there is also a chance of failure which may require further management.  Following the completion of the discussion, the patient expressed understanding of this planned course of care, all their questions were answered and consent will be obtained preoperatively.  I also reviewed the typical postoperative recovery of 6-12 months before maximal recovery, and possible need for rehabilitation stay after hospitalization. I also explained that in some series of patients in the research literature, up to 20% of patients are dissatisfied with their total knee arthroplasty. I also discussed the risks of of anesthesia. I also explained  that she would meet with Anesthesiology preoperatively to discuss anesthetic risk.  All questions were answered.     Plan for left total knee arthroplasty.    Implants: Olivares and Nephew cemented Legion TKS with [employer]  Anticoagulation: Asprin  Antibiotics: Cefazolin  Admission Type: 23 HR Obs  TXA: Yes      Edilma Doss was in agreement with plan and had all questions answered.     Medications:  No orders of the defined types were placed in this encounter.      Followup:  No follow-ups on file.    Diagnoses and all orders for this visit:    1. Primary osteoarthritis of left knee (Primary)  -     Case Request; Standing  -     CBC and Differential; Future  -     Basic metabolic panel; Future  -     MRSA Screen Culture (Outpatient) - Swab, Nares; Future  -     Hemoglobin A1c; Future  -     Type and screen; Future  -     Urinalysis With Culture If Indicated -; Future  -     lactated ringers bolus 500 mL  -     Tranexamic Acid 1,000 mg in sodium chloride 0.9 % 100 mL  -     Tranexamic Acid 1,000 mg in sodium chloride 0.9 % 100 mL  -     Chlorhexidine Gluconate Cloth 2 % pads  -     ethyl alcohol 62 % 2 each  -     ceFAZolin (ANCEF) 2 g in sodium chloride 0.9 % 100 mL IVPB  -     meloxicam (MOBIC) tablet 15 mg  -     pregabalin (LYRICA) capsule 150 mg  -     Case Request    2. Aftercare following left knee joint replacement surgery  -     CBC and Differential; Future    3. Abnormal finding of blood chemistry, unspecified  -     Hemoglobin A1c; Future    Other orders  -     Follow Anesthesia Guidelines / Protocol; Future  -     Follow Anesthesia Guidelines / Protocol; Standing  -     Verify NPO Status; Standing  -     SCD (sequential compression device)- to be placed on patient in Pre-op; Standing  -     Clip operative site; Standing  -     Obtain informed consent (if not collected inpatient or PAT); Standing  -     Care Order / Instruction for all Female Patients: Clean Catch Urinalysis with culture if  indicated if patient symptomatic: dysuria, flank or lower abdominal pain, burning, urgency or frequency  -     Initiate Observation Status; Standing  -     Nerve Block; Standing          Dictated utilizing Dragon dictation

## 2024-07-09 ENCOUNTER — OFFICE VISIT (OUTPATIENT)
Dept: CARDIOLOGY | Facility: CLINIC | Age: 74
End: 2024-07-09
Payer: MEDICARE

## 2024-07-09 VITALS
DIASTOLIC BLOOD PRESSURE: 70 MMHG | HEIGHT: 63 IN | HEART RATE: 66 BPM | SYSTOLIC BLOOD PRESSURE: 118 MMHG | BODY MASS INDEX: 31.89 KG/M2 | WEIGHT: 180 LBS

## 2024-07-09 DIAGNOSIS — E78.2 MIXED HYPERLIPIDEMIA: ICD-10-CM

## 2024-07-09 DIAGNOSIS — I25.10 CAD IN NATIVE ARTERY: Primary | ICD-10-CM

## 2024-07-09 DIAGNOSIS — E78.5 DYSLIPIDEMIA, GOAL LDL BELOW 70: ICD-10-CM

## 2024-07-09 DIAGNOSIS — Z01.810 PREOP CARDIOVASCULAR EXAM: ICD-10-CM

## 2024-07-09 PROCEDURE — 93000 ELECTROCARDIOGRAM COMPLETE: CPT | Performed by: STUDENT IN AN ORGANIZED HEALTH CARE EDUCATION/TRAINING PROGRAM

## 2024-07-09 PROCEDURE — 99214 OFFICE O/P EST MOD 30 MIN: CPT | Performed by: STUDENT IN AN ORGANIZED HEALTH CARE EDUCATION/TRAINING PROGRAM

## 2024-07-09 PROCEDURE — 3074F SYST BP LT 130 MM HG: CPT | Performed by: STUDENT IN AN ORGANIZED HEALTH CARE EDUCATION/TRAINING PROGRAM

## 2024-07-09 PROCEDURE — 3078F DIAST BP <80 MM HG: CPT | Performed by: STUDENT IN AN ORGANIZED HEALTH CARE EDUCATION/TRAINING PROGRAM

## 2024-07-09 NOTE — PROGRESS NOTES
[facility]    Subjective:     Encounter Date:07/09/24      Patient ID: Edilma Doss is a 73 y.o. female.    Chief Complaint:   Chief Complaint   Patient presents with    Coronary Artery Disease   Establish care for CAD  History of Present Illness    Ms. Doss is a 72-year-old with a past medical history hypertension, hyperlipidemia, coronary disease status post PCI 2010's at [facility], borderline ischemic cardiomyopathy who presents for further evaluation.      She previously followed the cardiologist at [facility] in [place].      She reports intermittent palpitation episodes which is having randomly, sporadically for minutes at a time over the last several years.  Subsequent Holter monitor revealed some short runs of atrial tachycardia.  Improved with metoprolol for which she continues.    As part of her work-up for dyspnea, she underwent a stress test which in 2018 showed a small amount of apical ischemia reportedly, is being managed medically.  She has no overt angina.  She also reports that she had ABIs which revealed peripheral vascular disease, no significant claudication.  An echo in January 2019 showed LVEF of 45 to 50%.    Since her last visit, she has intentionally lost over 100 pounds.  She reports her functional capacity is good, she is able to escalate flights of stairs without stopping, she is able to do yard work and she has no angina or any dyspnea symptoms.  She underwent a repeat echocardiogram but showed recovery of her LVEF.  She otherwise has no complaints today and feels well.    Echocardiogram January 2024:    Left ventricular systolic function is normal. Calculated left ventricular EF = 58.8%    Left ventricular diastolic function was normal.    Estimated right ventricular systolic pressure from tricuspid regurgitation is normal (<35 mmHg).    Holter monitor November 2023:    A relatively benign monitor study.    There were a total of  "3 supraventricular runs with longest lasting 19.2 seconds with average heart rate of 110 bpm.    A total of 14 patient triggered and 13 diary events submitted.  Symptoms included skipped beats/lightheadedness.  Most did not have any arrhythmia correlation but few episodes correlate with premature ventricular complexes.    The following portions of the patient's history were reviewed and updated as appropriate: allergies, current medications, past family history, past medical history, past social history, past surgical history and problem list.    Past Medical History:   Diagnosis Date    Allergic     Anxiety     Bursitis of hip hurts when walking and sleeping on side.    COPD (chronic obstructive pulmonary disease)     CTS (carpal tunnel syndrome) yes, 1986 ?    Depression     Frozen shoulder     Hip arthrosis 1122/    pain when walking    Knee sprain twisted it Sore    Knee swelling     Obesity     Periarthritis of shoulder grinds    Tear of meniscus of knee left knee, 5yrs. ago now right knee I think.       Past Surgical History:   Procedure Laterality Date    CHOLECYSTECTOMY      COLONOSCOPY      CORONARY STENT PLACEMENT      GALLBLADDER SURGERY      HAND SURGERY  both thumb joints from repitation movements    SHOULDER SURGERY      TONSILLECTOMY      TRIGGER POINT INJECTION  knleft knee    TUBAL ABDOMINAL LIGATION             ECG 12 Lead    Date/Time: 7/9/2024 3:22 PM  Performed by: Vega Javier MD    Authorized by: Vega Javier MD  Comparison: compared with previous ECG from 9/6/2023  Similar to previous ECG  Rhythm: sinus rhythm  Rate: normal  Conduction: conduction normal  ST Segments: ST segments normal  T Waves: T waves normal  QRS axis: normal  Other: no other findings    Clinical impression: normal ECG             Objective:     Vitals:    07/09/24 1505   BP: 118/70   Pulse: 66   Weight: 81.6 kg (180 lb)   Height: 160 cm (63\")           Constitutional:       Appearance: Healthy appearance. Not in " distress.   Neck:      Vascular: JVD normal.   Pulmonary:      Effort: Pulmonary effort is normal.      Breath sounds: Normal breath sounds.   Cardiovascular:      PMI at left midclavicular line. Normal rate. Regular rhythm. Normal S2.       Murmurs: There is no murmur.   Pulses:     Decreased pulses.      Radial: 2+ bilaterally.     Dorsalis pedis: 2+ bilaterally.     Posterior tibial: 1+ on the left side and 2+ on the right side.  Edema:     Peripheral edema absent.   Skin:     General: Skin is warm and dry.   Neurological:      General: No focal deficit present.      Mental Status: Alert, oriented to person, place, and time and oriented to person, place and time.   Psychiatric:         Mood and Affect: Mood and affect normal.         Lab Review:     Lipid Panel          9/6/2023    13:47 12/29/2023    11:50 7/1/2024    00:00   Lipid Panel   Total Cholesterol 139      Total Cholesterol  117  124    Triglycerides 123  115  130    HDL Cholesterol 46  52  57    VLDL Cholesterol 22  21  23    LDL Cholesterol  71  44  44    LDL/HDL Ratio 1.49        BUN   Date Value Ref Range Status   07/01/2024 24 8 - 27 mg/dL Final     Creatinine   Date Value Ref Range Status   07/01/2024 0.90 0.57 - 1.00 mg/dL Final     Potassium   Date Value Ref Range Status   07/01/2024 4.7 3.5 - 5.2 mmol/L Final     ALT (SGPT)   Date Value Ref Range Status   07/01/2024 25 0 - 32 IU/L Final     AST (SGOT)   Date Value Ref Range Status   07/01/2024 23 0 - 40 IU/L Final           Assessment:          Diagnosis Plan   1. CAD in native artery  ECG 12 Lead      2. Dyslipidemia, goal LDL below 70        3. Preop cardiovascular exam        4. Mixed hyperlipidemia                   Plan:         Coronary disease status post PCI, remote, [facility]: Free of angina  Mild ischemic cardiomyopathy EF 45 to 50%, now with recovered LVEF  Hyperlipidemia  Repeat echo shows recovery  Continue metoprolol succinate 50 daily   She continued to have  significant bruising and bleeding on Plavix monotherapy so she is on aspirin monotherapy and tolerating it well.  Cardiovascular assessment for upcoming noncardiac surgery.  She is being considered for TKA.  From the cardiac standpoint, she appears well compensated.  Her ECG is normal, she has no functional complaints, no chest pain or no angina.  She has good functional capacity is able to escalate flights of stairs without stopping, but she does have some issues due to her knee but she never gets held back by any dyspnea or chest pain.  She is low risk for perioperative M ACE for a low risk knee operation, and her risk is nonmodifiable.  She does not meet guideline recommendations for any additional cardiac testing and she can perform 4 METS of exertion without any functional imitations.  Would consider continuing aspirin 81 preoperatively.  Palpitations: Fleeting, short episodes that are likely symptomatic PVCs.  2-week patch Holter did not have any significant arrhythmias.    Obesity.  She continues with weight loss with Rybelsus.  Will keep a watch on her cholesterol but she may no longer need the Zetia if she continues to lose weight.  For now would favor keeping it.      RTC 6 months.    Vega Javier MD  [facility]  07/09/24  13:04 EDT       Current Outpatient Medications:     aspirin 81 MG EC tablet, Take 1 tablet by mouth Daily., Disp: 90 tablet, Rfl: 3    atorvastatin (LIPITOR) 40 MG tablet, Take 1 tablet by mouth Every Night., Disp: 90 tablet, Rfl: 3    cholecalciferol (VITAMIN D3) 10 MCG (400 UNIT) tablet, Take 1 tablet by mouth., Disp: , Rfl:     CINNAMON PO, Take  by mouth., Disp: , Rfl:     clotrimazole-betamethasone (LOTRISONE) 1-0.05 % cream, 1 cream two times daily, Disp: , Rfl:     escitalopram (LEXAPRO) 10 MG tablet, Take 1 tablet by mouth Daily., Disp: 90 tablet, Rfl: 2    ezetimibe (ZETIA) 10 MG tablet, Take 1 tablet by mouth Daily., Disp: 90 tablet, Rfl: 3    Flaxseed,  Linseed, (Flax Seed Oil) 1000 MG capsule, Take 1,000 mg by mouth., Disp: , Rfl:     Fluticasone-Umeclidin-Vilant (Trelegy Ellipta) 100-62.5-25 MCG/ACT inhaler, Inhale 1 puff., Disp: , Rfl:     Magnesium 250 MG tablet, Take  by mouth., Disp: , Rfl:     metoprolol succinate XL (TOPROL-XL) 50 MG 24 hr tablet, Take 1 tablet by mouth Daily., Disp: 90 tablet, Rfl: 3    nitroglycerin (NITROSTAT) 0.4 MG SL tablet, Place 1 tablet under the tongue Every 5 (Five) Minutes As Needed for Chest Pain. Take no more than 3 doses in 15 minutes., Disp: 30 tablet, Rfl: 11    Semaglutide (Rybelsus) 14 MG tablet, Take 1 tablet by mouth Daily., Disp: 90 tablet, Rfl: 0    vitamin C (ASCORBIC ACID) 250 MG tablet, Take 2 tablets by mouth Daily., Disp: , Rfl:          Return in about 6 months (around 1/9/2025).      Part of this note may be an electronic transcription/translation of spoken language to printed text using the Dragon Dictation System.

## 2024-07-10 ENCOUNTER — TELEPHONE (OUTPATIENT)
Dept: ORTHOPEDIC SURGERY | Facility: CLINIC | Age: 74
End: 2024-07-10
Payer: MEDICARE

## 2024-07-10 NOTE — TELEPHONE ENCOUNTER
This patient called in today wanting to have an order sent over to her insurance company for home health following her upcoming surgery with Dr. Gracia.     Her surgery is set for 10-2-2024. I informed the patient that I would take a message and get it over to the correct person and we will get everything sent over to her insurance.     Patient verbally understood.

## 2024-07-10 NOTE — TELEPHONE ENCOUNTER
Returned call to patient to let her know that Case Management over in the hospital will call and get her home health all set up for her before she leaves the hospital following her surgery.     Patient verbally understood.

## 2024-07-16 ENCOUNTER — TELEPHONE (OUTPATIENT)
Dept: ORTHOPEDIC SURGERY | Facility: CLINIC | Age: 74
End: 2024-07-16

## 2024-07-16 NOTE — TELEPHONE ENCOUNTER
"  Caller: RomarioEdilma \"ALIA\"    Relationship: Self    Best call back number: [phone]    Who are you requesting to speak with (clinical staff, provider,  specific staff member): CLINICAL    What was the call regarding: MS SAHA WOULD LIKE TO KNOW IF THERE IS A TEST SHE CAN TAKE TO KNOW IF SHE IS READY TO GET A KNEE REPLACEMENT. HOW DOES SHE KNOW IF SHE SHOULD BE GETTING A KNEE REPLACEMENT OR NOT? SHE DOES NOT WANT TO MURRAY IT IF SHE DOESN'T HAVE TO    Is it okay if the provider responds through MyChart: CALL    "

## 2024-08-01 DIAGNOSIS — R73.03 PREDIABETES: ICD-10-CM

## 2024-08-01 RX ORDER — ORAL SEMAGLUTIDE 14 MG/1
1 TABLET ORAL DAILY
Qty: 90 TABLET | Refills: 3 | Status: SHIPPED | OUTPATIENT
Start: 2024-08-01

## 2024-08-27 DIAGNOSIS — F33.41 RECURRENT MAJOR DEPRESSIVE DISORDER, IN PARTIAL REMISSION: ICD-10-CM

## 2024-08-28 RX ORDER — ESCITALOPRAM OXALATE 10 MG/1
10 TABLET ORAL DAILY
Qty: 90 TABLET | Refills: 3 | Status: SHIPPED | OUTPATIENT
Start: 2024-08-28

## 2024-10-08 ENCOUNTER — HOSPITAL ENCOUNTER (OUTPATIENT)
Dept: PHYSICAL THERAPY | Facility: HOSPITAL | Age: 74
Setting detail: THERAPIES SERIES
Discharge: HOME | End: 2024-10-08
Payer: MEDICARE

## 2024-10-08 ENCOUNTER — PRE-ADMISSION TESTING (OUTPATIENT)
Dept: PREADMISSION TESTING | Facility: HOSPITAL | Age: 74
End: 2024-10-08
Payer: MEDICARE

## 2024-10-08 VITALS
WEIGHT: 189.8 LBS | DIASTOLIC BLOOD PRESSURE: 89 MMHG | RESPIRATION RATE: 18 BRPM | HEIGHT: 63 IN | BODY MASS INDEX: 33.63 KG/M2 | HEART RATE: 71 BPM | OXYGEN SATURATION: 96 % | SYSTOLIC BLOOD PRESSURE: 156 MMHG

## 2024-10-08 DIAGNOSIS — M17.12 PRIMARY OSTEOARTHRITIS OF LEFT KNEE: ICD-10-CM

## 2024-10-08 DIAGNOSIS — Z96.652 AFTERCARE FOLLOWING LEFT KNEE JOINT REPLACEMENT SURGERY: ICD-10-CM

## 2024-10-08 DIAGNOSIS — Z47.1 AFTERCARE FOLLOWING LEFT KNEE JOINT REPLACEMENT SURGERY: ICD-10-CM

## 2024-10-08 LAB
ABO GROUP BLD: NORMAL
ABO GROUP BLD: NORMAL
ANION GAP SERPL CALCULATED.3IONS-SCNC: 10.4 MMOL/L (ref 5–15)
BASOPHILS # BLD AUTO: 0.04 10*3/MM3 (ref 0–0.2)
BASOPHILS NFR BLD AUTO: 0.5 % (ref 0–1.5)
BILIRUB UR QL STRIP: NEGATIVE
BLD GP AB SCN SERPL QL: NEGATIVE
BUN SERPL-MCNC: 24 MG/DL (ref 8–23)
BUN/CREAT SERPL: 30.8 (ref 7–25)
CALCIUM SPEC-SCNC: 9.6 MG/DL (ref 8.6–10.5)
CHLORIDE SERPL-SCNC: 104 MMOL/L (ref 98–107)
CLARITY UR: CLEAR
CO2 SERPL-SCNC: 21.6 MMOL/L (ref 22–29)
COLOR UR: YELLOW
CREAT SERPL-MCNC: 0.78 MG/DL (ref 0.57–1)
DEPRECATED RDW RBC AUTO: 44.5 FL (ref 37–54)
EGFRCR SERPLBLD CKD-EPI 2021: 79.3 ML/MIN/1.73
EOSINOPHIL # BLD AUTO: 0.11 10*3/MM3 (ref 0–0.4)
EOSINOPHIL NFR BLD AUTO: 1.4 % (ref 0.3–6.2)
ERYTHROCYTE [DISTWIDTH] IN BLOOD BY AUTOMATED COUNT: 13.1 % (ref 12.3–15.4)
GLUCOSE SERPL-MCNC: 95 MG/DL (ref 65–99)
GLUCOSE UR STRIP-MCNC: NEGATIVE MG/DL
HCT VFR BLD AUTO: 41 % (ref 34–46.6)
HGB BLD-MCNC: 13 G/DL (ref 12–15.9)
HGB UR QL STRIP.AUTO: NEGATIVE
IMM GRANULOCYTES # BLD AUTO: 0.02 10*3/MM3 (ref 0–0.05)
IMM GRANULOCYTES NFR BLD AUTO: 0.3 % (ref 0–0.5)
KETONES UR QL STRIP: NEGATIVE
LEUKOCYTE ESTERASE UR QL STRIP.AUTO: NEGATIVE
LYMPHOCYTES # BLD AUTO: 2.97 10*3/MM3 (ref 0.7–3.1)
LYMPHOCYTES NFR BLD AUTO: 38.2 % (ref 19.6–45.3)
MCH RBC QN AUTO: 29.5 PG (ref 26.6–33)
MCHC RBC AUTO-ENTMCNC: 31.7 G/DL (ref 31.5–35.7)
MCV RBC AUTO: 93 FL (ref 79–97)
MONOCYTES # BLD AUTO: 0.56 10*3/MM3 (ref 0.1–0.9)
MONOCYTES NFR BLD AUTO: 7.2 % (ref 5–12)
NEUTROPHILS NFR BLD AUTO: 4.08 10*3/MM3 (ref 1.7–7)
NEUTROPHILS NFR BLD AUTO: 52.4 % (ref 42.7–76)
NITRITE UR QL STRIP: NEGATIVE
NRBC BLD AUTO-RTO: 0 /100 WBC (ref 0–0.2)
PH UR STRIP.AUTO: 5.5 [PH] (ref 4.5–8)
PLATELET # BLD AUTO: 212 10*3/MM3 (ref 140–450)
PMV BLD AUTO: 10 FL (ref 6–12)
POTASSIUM SERPL-SCNC: 4.3 MMOL/L (ref 3.5–5.2)
PROT UR QL STRIP: NEGATIVE
RBC # BLD AUTO: 4.41 10*6/MM3 (ref 3.77–5.28)
RH BLD: POSITIVE
RH BLD: POSITIVE
SODIUM SERPL-SCNC: 136 MMOL/L (ref 136–145)
SP GR UR STRIP: 1.02 (ref 1–1.03)
T&S EXPIRATION DATE: NORMAL
UROBILINOGEN UR QL STRIP: NORMAL
WBC NRBC COR # BLD AUTO: 7.78 10*3/MM3 (ref 3.4–10.8)

## 2024-10-08 PROCEDURE — 80048 BASIC METABOLIC PNL TOTAL CA: CPT | Performed by: INTERNAL MEDICINE

## 2024-10-08 PROCEDURE — 85025 COMPLETE CBC W/AUTO DIFF WBC: CPT | Performed by: INTERNAL MEDICINE

## 2024-10-08 PROCEDURE — 86900 BLOOD TYPING SEROLOGIC ABO: CPT

## 2024-10-08 PROCEDURE — 86901 BLOOD TYPING SEROLOGIC RH(D): CPT | Performed by: INTERNAL MEDICINE

## 2024-10-08 PROCEDURE — 36415 COLL VENOUS BLD VENIPUNCTURE: CPT

## 2024-10-08 PROCEDURE — 86900 BLOOD TYPING SEROLOGIC ABO: CPT | Performed by: INTERNAL MEDICINE

## 2024-10-08 PROCEDURE — 81003 URINALYSIS AUTO W/O SCOPE: CPT | Performed by: INTERNAL MEDICINE

## 2024-10-08 PROCEDURE — 86901 BLOOD TYPING SEROLOGIC RH(D): CPT

## 2024-10-08 PROCEDURE — 86850 RBC ANTIBODY SCREEN: CPT | Performed by: INTERNAL MEDICINE

## 2024-10-08 PROCEDURE — 87081 CULTURE SCREEN ONLY: CPT | Performed by: INTERNAL MEDICINE

## 2024-10-08 RX ORDER — CLOPIDOGREL BISULFATE 75 MG/1
75 TABLET ORAL DAILY
COMMUNITY
Start: 2024-10-05

## 2024-10-08 NOTE — PAT
Pt here for PAT visit.  Pre-op tests completed, chg soap given, and instructions reviewed.  Instructed clears until 2 hrs prior to arrival time, voiced understanding. ERAS and CHRISTOPHER dressing handouts provided to the patient. Patient was instructed that prescribing physician will give her holding instructions for her plavix prior to surgery. Patient is aware and in agreement with her treatment plan.

## 2024-10-08 NOTE — DISCHARGE INSTRUCTIONS
PRE-ADMISSION TESTING INSTRUCTIONS FOR ADULTS    Take these medications the morning of surgery with a small sip of water: trelegy ellipta, lexapro, atorvastatin, metoprolol    *hold rybelsus for 1 day prior to surgery  *hold plavix her prescribing physician recommendation     Do not take any insulin or diabetes medications the morning of surgery.      No aspirin, advil, aleve, ibuprofen, naproxen, diet pills, decongestants, or herbal/vitamins for a week prior to surgery.       Tylenol/Acetaminophen is okay to take if needed.    General Instructions:    DO NOT EAT SOLID FOOD AFTER MIDNIGHT THE NIGHT BEFORE SURGERY. No gum, mints, or hard candy after midnight the night before surgery.  You may drink clear liquids the day of surgery up until 2 hours before your arrival time.  Clear liquids are liquids you can see through. Nothing RED in color.    Plain water    Sports drinks      Gelatin (Jell-O)  Fruit juices without pulp such as white grape juice and apple juice  Popsicles that contain no fruit or yogurt  Tea or coffee (no cream or milk added)    It is beneficial for you to have a clear drink that contains carbohydrates 2 hours before your arrival time.  We suggest a 20 ounce bottle of Gatorade or Powerade for non-diabetic patients or a 20 ounce bottle of Gatorade Zero or Powerade Zero for diabetic patients.     Patients who avoid smoking, chewing tobacco and alcohol for 4 weeks prior to surgery have a reduced risk of post-operative complications.  If at all possible, quit smoking as many days before surgery as you can.    Do not smoke, use chewing tobacco or drink alcohol the day of surgery    Bring your C-PAP/ BI-PAP machine if you use one.  Wear clean comfortable clothes.  Do not wear contact lenses, lotion, deodorant, or make-up.  Bring a case for your glasses if applicable. You may brush your teeth the morning of surgery.  You may wear dentures/partials, do not put adhesive/glue on them.  Leave all other jewelry  and valuables at home.      Preventing a Surgical Site Infection:    Shower the night before and on the morning of surgery using the chlorhexidine soap you were given.  Use a clean washcloth with the soap.  Place clean sheets on your bed after showering the night before surgery. Do not use the CHG soap on your hair, face, or private areas. Wash your body gently for five (5) minutes. Do not scrub your skin.  Dry with a clean towel and dress in clean clothing.  Do not shave the surgical area for 10 days-2 weeks prior to surgery  because the razor can irritate skin and make it easier to develop an infection.  Make sure you, your family, and all healthcare providers clean their hands with soap and water or an alcohol based hand  before caring for you or your wound.      Day of surgery:    Your surgeon’s office will advise you of your arrival time for the day of surgery.    Upon arrival, a Pre-op nurse and Anesthesia provider will review your health history, obtain vital signs, and answer questions you may have. The anesthesia provider will also discuss the type of anesthesia that will be needed for your procedure, which may include general anesthesia. The only belongings needed at this time will be your home medications and if applicable your C-PAP/BI-PAP machine.  If you are staying overnight your family can leave the rest of your belongings in the car and bring them to your room later.  A Pre-op nurse will start an IV and you may receive medication in preparation for surgery, including something to help you relax.  Your family will be able to see you in the Pre-op area.  While you are in surgery your family should notify the waiting room  if they leave the waiting room area and provide a contact phone number.    IF you have any questions, you can call the Pre-Admission Department at [phone] or your surgeon's office.  Notify your surgeon if  you become sick, have a fever, productive cough,  or cannot be here the day of surgery    Please be aware that surgery does come with discomfort.  We want to make every effort to control your discomfort so please discuss any uncontrolled symptoms with your nurse.   Your doctor will most likely have prescribed pain medications.      If you are going home after surgery, you will receive individualized written care instructions before being discharged.  A responsible adult (over the age of 18) must drive you to and from the hospital on the day of your surgery and stay with you for 24 hours after anesthesia.    If you are staying overnight following surgery, you will be transported to your hospital room following the recovery period.  [facility] has all private rooms.    You may receive a survey regarding the care you received. Your feedback is very important and will be used to collect the necessary data to help us to continue to provide excellent care.     Deductibles and co-payments are collected on the day of service. Please be prepared to pay the required co-pay, deductible or deposit on the day of service as defined by your plan.

## 2024-10-09 LAB — MRSA SPEC QL CULT: NORMAL

## 2024-10-14 ENCOUNTER — OFFICE VISIT (OUTPATIENT)
Dept: FAMILY MEDICINE CLINIC | Facility: CLINIC | Age: 74
End: 2024-10-14
Payer: MEDICARE

## 2024-10-14 VITALS
BODY MASS INDEX: 33.49 KG/M2 | RESPIRATION RATE: 18 BRPM | SYSTOLIC BLOOD PRESSURE: 112 MMHG | DIASTOLIC BLOOD PRESSURE: 74 MMHG | WEIGHT: 189 LBS | HEIGHT: 63 IN | OXYGEN SATURATION: 94 % | HEART RATE: 78 BPM

## 2024-10-14 DIAGNOSIS — M17.0 PRIMARY OSTEOARTHRITIS OF BOTH KNEES: ICD-10-CM

## 2024-10-14 DIAGNOSIS — Z01.818 PREOPERATIVE EXAMINATION: Primary | ICD-10-CM

## 2024-10-14 DIAGNOSIS — Z23 ENCOUNTER FOR IMMUNIZATION: ICD-10-CM

## 2024-10-14 PROCEDURE — 3078F DIAST BP <80 MM HG: CPT | Performed by: STUDENT IN AN ORGANIZED HEALTH CARE EDUCATION/TRAINING PROGRAM

## 2024-10-14 PROCEDURE — 1160F RVW MEDS BY RX/DR IN RCRD: CPT | Performed by: STUDENT IN AN ORGANIZED HEALTH CARE EDUCATION/TRAINING PROGRAM

## 2024-10-14 PROCEDURE — 99213 OFFICE O/P EST LOW 20 MIN: CPT | Performed by: STUDENT IN AN ORGANIZED HEALTH CARE EDUCATION/TRAINING PROGRAM

## 2024-10-14 PROCEDURE — 1159F MED LIST DOCD IN RCRD: CPT | Performed by: STUDENT IN AN ORGANIZED HEALTH CARE EDUCATION/TRAINING PROGRAM

## 2024-10-14 PROCEDURE — 3074F SYST BP LT 130 MM HG: CPT | Performed by: STUDENT IN AN ORGANIZED HEALTH CARE EDUCATION/TRAINING PROGRAM

## 2024-10-14 PROCEDURE — 1125F AMNT PAIN NOTED PAIN PRSNT: CPT | Performed by: STUDENT IN AN ORGANIZED HEALTH CARE EDUCATION/TRAINING PROGRAM

## 2024-10-15 NOTE — DISCHARGE PLACEMENT REQUEST
"Antonino Saha \"ALIA\" (73 y.o. Female)       Date of Birth   1950    Social Security Number       Address   [address] [id number]    Home Phone   [phone]    MRN   [id number]       [employer]   [facility]    Marital Status                               Admission Date       Admission Type   Elective    Admitting Provider   Rowdy Gracia MD    Attending Provider   Rowdy Gracia MD    Department, Room/Bed   [facility], --/--       Discharge Date       Discharge Disposition       Discharge Destination                                 Attending Provider: Rowdy Gracia MD    Allergies: No Known Allergies    Isolation: None   Infection: None   Code Status: Not on file    Ht: 160 cm (63\")   Wt: 85.7 kg (189 lb)    Admission Cmt: None   Principal Problem: OA (osteoarthritis) of knee [M17.9]                   Active Insurance as of 10/23/2024       Primary Coverage       Payor Plan Insurance Group Employer/Plan Group    [facility] MEDICARE REPLACEMENT [facility] MED [facility] GROUP [id number]       Payor Plan Address Payor Plan Phone Number Payor Plan Fax Number Effective Dates    [address] [address]   1/1/2023 - None Entered    [facility] [id number]         Subscriber Name Subscriber Birth Date Member ID       ANTOINNO SAHA 1950 [id number]                     Emergency Contacts        (Rel.) Home Phone Work Phone Mobile Phone    SHARIFA SAHA (Spouse) -- -- [phone]    staffordjose (Daughter) -- -- [phone]                "

## 2024-10-16 NOTE — CASE MANAGEMENT/SOCIAL WORK
Continued Stay Note  [place]     Patient Name: Edilma Doss  MRN: [id number]  Today's Date: 10/16/2024    Admit Date: (Not on file)        Discharge Plan       Row Name 10/16/24 1345       Plan    Plan Comments CM spoke with patient to arrange any needed equipment and discuss physical therapy for her surgery with Dr. Del Real on 10/23/2024. Patient states she has a rolling walker, BSC, shower chair and cane at home and does not anticipate needing any other equipment at discharge. She also states she would like to use  for in home physical therapy for the first couple of weeks due to transportation issues and is agreeable for  to arrange this service. After reviewing agency options she is agreeable to [facility]. She had no other questions. CM called and spoke with Soni at [facility] and she states they can accept. CM will follow.                   Discharge Codes    No documentation.                       Michelle Gaona RN

## 2024-10-22 ENCOUNTER — OFFICE VISIT (OUTPATIENT)
Dept: ORTHOPEDIC SURGERY | Facility: CLINIC | Age: 74
End: 2024-10-22
Payer: MEDICARE

## 2024-10-22 ENCOUNTER — ANESTHESIA EVENT (OUTPATIENT)
Dept: PERIOP | Facility: HOSPITAL | Age: 74
End: 2024-10-22
Payer: MEDICARE

## 2024-10-22 VITALS — WEIGHT: 194.4 LBS | BODY MASS INDEX: 34.45 KG/M2 | HEIGHT: 63 IN

## 2024-10-22 DIAGNOSIS — M17.12 PRIMARY OSTEOARTHRITIS OF LEFT KNEE: ICD-10-CM

## 2024-10-22 DIAGNOSIS — M17.11 PRIMARY OSTEOARTHRITIS OF RIGHT KNEE: Primary | ICD-10-CM

## 2024-10-22 RX ORDER — TRIAMCINOLONE ACETONIDE 40 MG/ML
80 INJECTION, SUSPENSION INTRA-ARTICULAR; INTRAMUSCULAR
Status: COMPLETED | OUTPATIENT
Start: 2024-10-22 | End: 2024-10-22

## 2024-10-22 RX ORDER — LIDOCAINE HYDROCHLORIDE 10 MG/ML
4 INJECTION, SOLUTION EPIDURAL; INFILTRATION; INTRACAUDAL; PERINEURAL
Status: COMPLETED | OUTPATIENT
Start: 2024-10-22 | End: 2024-10-22

## 2024-10-22 RX ADMIN — LIDOCAINE HYDROCHLORIDE 4 ML: 10 INJECTION, SOLUTION EPIDURAL; INFILTRATION; INTRACAUDAL; PERINEURAL at 08:16

## 2024-10-22 RX ADMIN — TRIAMCINOLONE ACETONIDE 80 MG: 40 INJECTION, SUSPENSION INTRA-ARTICULAR; INTRAMUSCULAR at 08:16

## 2024-10-22 NOTE — PROGRESS NOTES
Encounter Diagnoses   Name Primary?    Primary osteoarthritis of right knee Yes    Primary osteoarthritis of left knee        Patient presents to clinic today for right knee injection(s). I explained details of injections as well as risks, benefits and alternatives with the patient today, had all questions answered, wished to proceed with injection.  I will see patient back as needed for follow up on injections. Patient was instructed to watch for signs or symptoms of infection including redness, swelling, warmth to the touch, or significant increased pain and to contact our office immediately if any of these issues were noted.      Follow up: as needed    - Large Joint Arthrocentesis: R knee on 10/22/2024 8:16 AM  Indications: pain  Details: 22 G needle, anterolateral approach  Medications: 80 mg triamcinolone acetonide 40 MG/ML; 4 mL lidocaine PF 1% 1 %  Outcome: tolerated well, no immediate complications  Procedure, treatment alternatives, risks and benefits explained, specific risks discussed. Consent was given by the patient. Immediately prior to procedure a time out was called to verify the correct patient, procedure, equipment, support staff and site/side marked as required. Patient was prepped and draped in the usual sterile fashion.       Imagin views of the left knee were ordered and reviewed by myself in the office today  Indication: left knee pain  Findings: X-rays demonstrate no acute osseous abnormality.  There is no signs of fracture dislocation or subluxation.  There is joint space narrowing, subchondral sclerosis, cystic changes, and periarticular osteophytes most pronounced in the Medial joint.  Comparative studies: None

## 2024-10-22 NOTE — H&P
[facility]   HISTORY AND PHYSICAL    Patient Name:Edilma Doss  : 1950  MRN: [id number]  Primary Care Physician: Ferny Mosley DO  Date of admission: (Not on file)    Subjective   Subjective     Chief Complaint: Left knee pain/OA    History of Present Illness   Edilma Doss is a 73 y.o. female with a longstanding history of left knee pain.  The patient states the knee pain is severe and debilitating.  She states that she has tried conservative treatments consisting of NSAIDs PT significant weight loss and steroid injections.  The pain is now refractory to conservative care.  She was seen by me on 2024.  At that point time the patient was scheduled for left total knee arthroplasty.  The patient presents today to the office for final preoperative evaluation in anticipation of her upcoming surgery tomorrow.    Review of Systems   Musculoskeletal:  Positive for arthralgias, gait problem, joint swelling and myalgias.   All other systems reviewed and are negative.        Personal History     Past Medical History:   Diagnosis Date    Allergic     Anxiety     Bursitis of hip hurts when walking and sleeping on side.    COPD (chronic obstructive pulmonary disease)     Coronary artery disease     stent placed    CTS (carpal tunnel syndrome) yes,  ?    Depression     Elevated cholesterol     Frozen shoulder     Hip arthrosis 1122/    pain when walking    Hyperlipidemia     Hypertension     Knee sprain twisted it Sore    Knee swelling     Obesity     Periarthritis of shoulder grinds    Sleep apnea     CPAP    Tear of meniscus of knee left knee, 5yrs. ago now right knee I think.       Past Surgical History:   Procedure Laterality Date    CHOLECYSTECTOMY      COLONOSCOPY      CORONARY STENT PLACEMENT      GALLBLADDER SURGERY      HAND SURGERY  both thumb joints from repitation movements    SHOULDER SURGERY Left     TONSILLECTOMY      TRIGGER POINT INJECTION  knleft knee    TUBAL ABDOMINAL LIGATION          Family History: Her family history includes Breast cancer in her father and maternal aunt; Cancer in her sister; Diabetes in her mother.     Social History: She  reports that she quit smoking about 24 years ago. Her smoking use included cigarettes. She started smoking about 57 years ago. She has a 65.9 pack-year smoking history. She has been exposed to tobacco smoke. She has never used smokeless tobacco. She reports that she does not drink alcohol and does not use drugs.    Home Medications:  Cinnamon, Flax Seed Oil, Fluticasone-Umeclidin-Vilant, Magnesium, Semaglutide, aspirin, atorvastatin, cholecalciferol, clopidogrel, clotrimazole-betamethasone, escitalopram, ezetimibe, metoprolol succinate XL, nitroglycerin, and vitamin C    Allergies:  She has No Known Allergies.    Objective    Objective     Vitals:         Physical Exam   Left Knee Exam      Tenderness   The patient is experiencing tenderness in the medial retinaculum, medial joint line and medial hamstring.     Range of Motion   Extension:  5   Flexion:  120      Tests   Varus: negative Valgus: negative  Lachman:  Anterior - negative    Posterior - negative  Drawer:  Anterior - negative     Posterior - negative     Other   Erythema: absent  Scars: absent  Sensation: normal  Pulse: present  Swelling: mild  Effusion: no effusion present    Result Review    Imagin views of the left knee were ordered and reviewed by myself in the office today  Indication: left knee pain  Findings: X-rays demonstrate no acute osseous abnormality.  There is no signs of fracture dislocation or subluxation.  There is joint space narrowing, subchondral sclerosis, cystic changes, and periarticular osteophytes most pronounced in the Medial joint.  Comparative studies: None      Assessment & Plan   Assessment / Plan     Brief Patient Summary:  Edilma Doss is a 73 y.o. female with severe left knee osteoarthritis    Active Hospital Problems:  There are no active hospital  problems to display for this patient.    Plan:   Cc: f/u left knee pain, DJD    Patient presented to clinic today for preoperative history and physical visit in anticipation of upcoming scheduled left total knee arthroplasty.    I reviewed anatomy and a model of a total knee arthroplasty, as well as typical postoperative recovery of 6-12 months before maximal recovery, and possible need for rehabilitation stay after hospitalization. We also discussed risks, benefits, and alternatives of procedure with risks including but not limited to neurovascular damage, bleeding, infection, malalignment, chronic pian, failure of implants, osteolysis, loosening of implants, loss of motion, weakness, stiffness, instability, DVT, pulmonary embolus, death, stroke, complex regional pain syndrome, myocardial infarction, and need for additional procedures. Patient understood all these and had all questions answered before consenting to the procedure. No guarantees were given in regards to results from the surgery.  Patient has been medically optimize by his primary care physician.    Plan for left total knee arthroplasty.    Implants: Olivares and Nephew cemented Legion TKS with [employer]  Anticoagulation: Asprin  Antibiotics: Cefazolin  Admission Type: 23 HR Obs  Post op ABX: No  TXA: Yes    All remaining questions answered today.       VTE Prophylaxis:  No VTE prophylaxis order currently exists.        Rowdy Gracia MD

## 2024-10-22 NOTE — OP NOTE
OPERATIVE REPORT    Date of Surgery:   10/23/24    Attending Surgeon:   Rowdy Gracia MD, MSE    ASSISTANTS:    Assistant: ([employer] CSA) was responsible for performing the following activities: Retraction, Suction, Suturing, Closing, and Placing Dressing and their skilled assistance was necessary for the success of this case.     PREOPERATIVE DIAGNOSIS:   Left knee osteoarthritis    POSTOPERATIVE DIAGNOSIS:   Same as above     PROCEDURE:   1. Left primary total knee arthroplasty, with robotic assistance (CORI)    Surgical Approach:        IMPLANTS:   : Smith [employer] Nephew  Brand: Legion TKS  Femoral component size: 4 CR narrow  Tibial component size: 2   Patellar Button: none  Bearing type: CR  Insert Thickness: 9 mm    SURGICAL DETAILS:  Preoperative Passive Range of Motion: 9 to 136 degrees  Postoperative Passive Range of Motion: 3 to 136 degrees  Pre Op 7 degrees Varus,  Post Op 3 degrees Varus   Incision/arthrotomy type: Medial parapatellar  Posterior Cruciate Ligament: Retained  Lateral release: No  Estimated blood loss: 100 cc  Anesthesia type: Spinal and Regional  Tourniquet: Yes: 250 mmHG     INDICATIONS FOR PROCEDURE:  This patient presents today with end stage degenerative joint disease of the knee. The patient has failed non-operative treatment and presents for total knee replacement. Risks, complications, and benefits of the procedure have been discussed and all questions have been answered preoperatively.    PROCEDURE:  The patient was met in the preoperative holding area, evaluated, consent was obtained, and the Left knee was marked. The patient was brought to the operating room and placed on the operating room table in the supine position. After anesthesia was established, the patient was positioned supine. Bony prominences were padded. The patient was given prophylactic antibiotics within one hour of skin incision. The involved lower extremity was prepped and draped in usual sterile  fashion.  Surgical timeout was taken to confirm the operative side and planned procedure.    A straight incision was used medial to the midline carried through the subcutaneous tissue to the underlying extensor mechanism. A medial parapatellar approach was utilized.  A small medial peel was then performed using Bovie electrocautery and the fat pad was sharply excised. The patella was everted, a lateral facetectomy was performed, marginal osteophytes trimmed,  and the synovial margin was cauterized, clearing excess synovium.  The synovium at the superolateral aspect of the junction of the trochlea and the anterior cortex the femur was removed with Bovie electrocautery and the knee was flexed.  The tourniquet was deflated at this point time.  The ACL was then removed and all overhanging tibial and femoral osteophytes were removed with a rongeur.  The tibial and femoral reference arrays were assembled first. 2 drill tip threaded pins were placed medial to the tibial tubercle within the surgical field. The pins were inserted under power using the drill guide included in the CORI set for their relative positions. Similarly, the 2 femoral pins were placed medially in the femur just proxmial to the medial epicondyle. The sensor arrays were assembled to the pins    The hip center was registered. The medial and lateral malleoli were registered. The femur and tibia were registered.    The knee motion and balance was assessed with standard poses. Pre-operative cut planning was adjusted to provide knee balance throughout the range of motion    We then turned out attention to the distal femur and using the CORI bur the distal femur resection was performed.  2 bur holes were then placed and the distal femoral punch was introduced for the  holes for the 4 in 1 femoral cutting block.     We then turned to the tibia and using the probe with a flat disc attached to it the tibial cutting block was floated into place using robotic  navication. It was held in place with 2 a to p pins and one cross pin.  Depth resection, varus/valgus, and tibial slope were again confirmed. We then performed our tibial resection with a Z retractor medially, bent homman laterally, and PCL retractor posteriorly.  The tibia resection was then removed.    Attention was then turned back to the femur and the appropriately sized 4 in 1 femoral cutting block was malleted into place and secured with 2 convergent threaded pins.     A laminar  was placed and the medial and lateral menisci were excised. Posterior femoral osteophytes were removed with an osteotome. The bovie was used to cauterize the posterior knee capsule and meniscal remnants.     The tibial surface was then sized using the trial baseplate and secured with 2 pins.  Careful attention was taken to ensure it was aligned with the medial third of the tibial tubercle. The trial femur was then impacted into place and the appropriate sized trail poly was inserted.  Knee motion and balance were checked manually and with the robotic assistance. The patella was not resurfaced. The patella tracked well. The femoral lug drills were performed. The trial femoral components and poly were removed. The tracker pins and arrays were removed.    Attention was then turned to the tibial prep.  The keel drill and punch introduced.     The trial tibial implant was removed. All bone was then prepared with lavage and drying for preparation prior to final component placement. The final tibial component was cemented into position and excess cement was removed. The final femoral component was cemented into position and excess cement was removed.   The trial poly was placed. After cement curing, motion and stability was again tested. The final tibial insert was exchanged to the final tibial insert which was impacted into position.  Surgical site was irrigated with dilute Betadine solution and then normal saline with pulsatile  lavage.  The medial and lateral capsular flaps as well as tibial and femoral periosteum was injected with a mixture consisting of 30 mL of 0.5% ropivacaine, 30 mg of ketorolac, and epinephrine 0.2 mg solution diluted in 30 mL of normal saline.    The arthrotomy was closed with #1 PDS stratafix. The subcutaneous tissue was closed with 2-0 monocryl. The skin was closed with running 3-0 monocryl stratafix and dermabond a sterile CHRISTOPHER dressing was placed.      The patient tolerated the procedure well and was taken to the recovery room in stable condition.  Following completion of the surgery all sponge instrument needle counts were correct x2.    POSTOPERATIVE PLAN:   1. Weightbearing as tolerated on operative extremity  2. DVT prophylaxis    WOUND CLOSURE:  #1 PDS Stratfix  2-0 monocryl subcuticular   3-0 monocryl stratafix skin  dermabond  CHRISTOPHER dressing  Ace wrap    SPONGE/INSTRUMENT/NEEDLE COUNTS:   Correct x 2    CONDITION ON DISCHARGE:   Stable    COMPLICATIONS:   None    Rowdy Gracia MD, MSE

## 2024-10-22 NOTE — H&P (VIEW-ONLY)
[facility]   HISTORY AND PHYSICAL    Patient Name:Edilma Doss  : 1950  MRN: [id number]  Primary Care Physician: Ferny Mosley DO  Date of admission: (Not on file)    Subjective   Subjective     Chief Complaint: Left knee pain/OA    History of Present Illness   Edilma Doss is a 73 y.o. female with a longstanding history of left knee pain.  The patient states the knee pain is severe and debilitating.  She states that she has tried conservative treatments consisting of NSAIDs PT significant weight loss and steroid injections.  The pain is now refractory to conservative care.  She was seen by me on 2024.  At that point time the patient was scheduled for left total knee arthroplasty.  The patient presents today to the office for final preoperative evaluation in anticipation of her upcoming surgery tomorrow.    Review of Systems   Musculoskeletal:  Positive for arthralgias, gait problem, joint swelling and myalgias.   All other systems reviewed and are negative.        Personal History     Past Medical History:   Diagnosis Date    Allergic     Anxiety     Bursitis of hip hurts when walking and sleeping on side.    COPD (chronic obstructive pulmonary disease)     Coronary artery disease     stent placed    CTS (carpal tunnel syndrome) yes,  ?    Depression     Elevated cholesterol     Frozen shoulder     Hip arthrosis 1122/    pain when walking    Hyperlipidemia     Hypertension     Knee sprain twisted it Sore    Knee swelling     Obesity     Periarthritis of shoulder grinds    Sleep apnea     CPAP    Tear of meniscus of knee left knee, 5yrs. ago now right knee I think.       Past Surgical History:   Procedure Laterality Date    CHOLECYSTECTOMY      COLONOSCOPY      CORONARY STENT PLACEMENT      GALLBLADDER SURGERY      HAND SURGERY  both thumb joints from repitation movements    SHOULDER SURGERY Left     TONSILLECTOMY      TRIGGER POINT INJECTION  knleft knee    TUBAL ABDOMINAL LIGATION          Family History: Her family history includes Breast cancer in her father and maternal aunt; Cancer in her sister; Diabetes in her mother.     Social History: She  reports that she quit smoking about 24 years ago. Her smoking use included cigarettes. She started smoking about 57 years ago. She has a 65.9 pack-year smoking history. She has been exposed to tobacco smoke. She has never used smokeless tobacco. She reports that she does not drink alcohol and does not use drugs.    Home Medications:  Cinnamon, Flax Seed Oil, Fluticasone-Umeclidin-Vilant, Magnesium, Semaglutide, aspirin, atorvastatin, cholecalciferol, clopidogrel, clotrimazole-betamethasone, escitalopram, ezetimibe, metoprolol succinate XL, nitroglycerin, and vitamin C    Allergies:  She has No Known Allergies.    Objective    Objective     Vitals:         Physical Exam   Left Knee Exam      Tenderness   The patient is experiencing tenderness in the medial retinaculum, medial joint line and medial hamstring.     Range of Motion   Extension:  5   Flexion:  120      Tests   Varus: negative Valgus: negative  Lachman:  Anterior - negative    Posterior - negative  Drawer:  Anterior - negative     Posterior - negative     Other   Erythema: absent  Scars: absent  Sensation: normal  Pulse: present  Swelling: mild  Effusion: no effusion present    Result Review    Imagin views of the left knee were ordered and reviewed by myself in the office today  Indication: left knee pain  Findings: X-rays demonstrate no acute osseous abnormality.  There is no signs of fracture dislocation or subluxation.  There is joint space narrowing, subchondral sclerosis, cystic changes, and periarticular osteophytes most pronounced in the Medial joint.  Comparative studies: None      Assessment & Plan   Assessment / Plan     Brief Patient Summary:  Edilma Doss is a 73 y.o. female with severe left knee osteoarthritis    Active Hospital Problems:  There are no active hospital  problems to display for this patient.    Plan:   Cc: f/u left knee pain, DJD    Patient presented to clinic today for preoperative history and physical visit in anticipation of upcoming scheduled left total knee arthroplasty.    I reviewed anatomy and a model of a total knee arthroplasty, as well as typical postoperative recovery of 6-12 months before maximal recovery, and possible need for rehabilitation stay after hospitalization. We also discussed risks, benefits, and alternatives of procedure with risks including but not limited to neurovascular damage, bleeding, infection, malalignment, chronic pian, failure of implants, osteolysis, loosening of implants, loss of motion, weakness, stiffness, instability, DVT, pulmonary embolus, death, stroke, complex regional pain syndrome, myocardial infarction, and need for additional procedures. Patient understood all these and had all questions answered before consenting to the procedure. No guarantees were given in regards to results from the surgery.  Patient has been medically optimize by his primary care physician.    Plan for left total knee arthroplasty.    Implants: Olivares and Nephew cemented Legion TKS with [employer]  Anticoagulation: Asprin  Antibiotics: Cefazolin  Admission Type: 23 HR Obs  Post op ABX: No  TXA: Yes    All remaining questions answered today.       VTE Prophylaxis:  No VTE prophylaxis order currently exists.        Rowdy Gracia MD

## 2024-10-22 NOTE — DISCHARGE INSTRUCTIONS
Total Knee Joint Replacement Discharge Instructions:    I. ACTIVITIES:  1. Exercises:  Complete exercise program as taught by the hospital physical therapist 2 times per day  Exercise program will be advanced by the physical therapist  During the day be up ambulating every 2 hours (while awake) for short distances  Complete the ankle pump exercises at least 10 times per hour (while awake)  Elevate legs most of the day the first week post operatively and thereafter elevate legs when in bed and for at least 30 minutes during the day. Caution must be taken to avoid pillow placement under the bend of the knee as this can led to flexion contractures of the knee.  Use cold packs 20-30 minutes approximately 5 times per day. This should be done before and after completing your exercises and at any time you are experiencing pain/ stiffness in your operative extremity.      2. Activities of Daily Living:  No tub baths, hot tubs, or swimming pools for 4 weeks  May shower and let water run over the incision on post-operative day #5 if no drainage. Do not scrub or rub the incision. Simply let the water run over the incision and pat dry.    II. Restrictions  Do not cross legs or kneel  Your surgeon will discuss with you when you will be able to drive again.  Weight bearing is as tolerated  First week stay inside on even terrain. May go up and down stairs one stair at a time utilizing the hand rail  After one week, you may venture outside.    III. Precautions:  Everyone that comes near you should wash their hands  No elective dental, genital-urinary, or colon procedures or surgical procedures for 12 weeks after surgery unless absolutely necessary.   If dental work or surgical procedure is deemed absolutely necessary, you will need to contact your surgeon as you will need to take antibiotics 1 hour prior to any dental work (including teeth cleanings).  Please discuss with your surgeon prophylactic antibiotics as the length of time  this intervention will be necessary for you varies with each patient’s health history and situation.  Avoid sick people. If you must be around someone who is ill, they should wear a mask.  Avoid visits to the Emergency Room or Urgent Care unless you are having a life threatening event.   If ordered stockings are to be placed on in the morning and removed at night. Monitor the stockings to ensure that any swelling is not causing the stockings to become too tight. In this case, remove stockings immediately.    IV. INCISION CARE:  May remove the Ace wrap 2 days following surgery  The negative pressure dressing with the battery pack is waterproof and should remain in place for 7 days.  You may shower with the negative pressure dressing as it is waterproof  Following removal of the dressing on day 7 you may shower and allow water to run over the incision  No submersion of incision in water such as tubs pools hot tubs etc.  No creams or ointments to the incision  Do not touch or pick at the incision  Check incision/dressing every day and notify surgeon immediately if any of the following signs or symptoms are noted:  Increase in redness  Increase in swelling around the incision and of the entire extremity  Increase in pain  Drainage oozing from the incision  Pulling apart of the edges of the incision  Increase in overall body temperature (greater than 100.5 degrees)  Your surgeon will instruct you regarding suture or staple removal    V. Medications:   1. Anticoagulants: You will be discharged on an anticoagulant. This is a prophylactic medication that helps prevent blood clots during your post-operative period. The type and length of dosage varies based on your individual needs, procedure performed, and surgeon’s preference.  While taking the anticoagulant, you should avoid taking any additional aspirin, ibuprofen (Advil or Motrin), Aleve (Naprosyn) or other non-steroidal anti-inflammatory medications.   Notify surgeon  immediately if any karolyn bleeding is noted in the urine, stool, emesis, or from the nose or the incision. Blood in the stool will often appear as black rather than red. Blood in urine may appear as pink. Blood in emesis may appear as brown/black like coffee grounds.  You will need to apply pressure for longer periods of time to any cuts or abrasions to stop bleeding  Avoid alcohol while taking anticoagulants    2. Stool Softeners: You will be at greater risk of constipation after surgery due to being less mobile and the pain medications.   Take stool softeners as instructed by your surgeon while on pain medications. Over the counter Colace 100 mg 1-2 capsules twice daily.   If stools become too loose or too frequent, please decreases the dosage or stop the stool softener.  If constipation occurs despite use of stool softeners, you are to continue the stool softeners and add a laxative (Milk of Magnesia 1 ounce daily as needed)  Drink plenty of fluids, and eat fruits and vegetables during your recovery time    3. Pain Medications utilized after surgery are narcotics and the law requires that the following information be given to all patients that are prescribed narcotics:  CLASSIFICATION: Pain medications are called Opioids and are narcotics  LEGALITIES: It is illegal to share narcotics with others and to drive within 24 hours of taking narcotics  POTENTIAL SIDE EFFECTS: Potential side effects of opioids include: nausea, vomiting, itching, dizziness, drowsiness, dry mouth, constipation, and difficulty urinating.  POTENTIAL ADVERSE EFFECTS:   Opioid tolerance can develop with use of pain medications and this simply means that it requires more and more of the medication to control pain; however, this is seen more in patients that use opioids for longer periods of time.  Opioid dependence can develop with use of Opioids and this simply means that to stop the medication can cause withdrawal symptoms; however, this is  seen with patients that use Opioids for longer periods of time.  Opioid addiction can develop with use of Opioids and the incidence of this is very unlikely in patients who take the medications as ordered and stop the medications as instructed.  Opioid overdose can be dangerous, but is unlikely when the medication is taken as ordered and stopped when ordered. It is important not to mix opioids with alcohol or with and type of sedative such as Benadryl as this can lead to over sedation and respiratory difficulty.  DOSAGE:   Pain medications will need to be taken consistently for the first week to decrease pain and promote adequate pain relief and participation in physical therapy.  After the initial surgical pain begins to resolve, you may begin to decrease the pain medication. By the end of 6 weeks, you should be off of pain medications.  Refills will not be given by the office during evening hours, on weekends, or after 6 weeks post-op.  To seek refills on pain medications during the initial 6 week post-operative period, you must call the office 48 hours in advance to request the refill. The office will then notify you when to  the prescription. DO NOT wait until you are out of the medication to request a refill.    V. FOLLOW-UP VISITS:  You will need to follow up in the office with your surgeon in 2 weeks. Please call this number [phone] to schedule this appointment.  If you have any concerns or suspected complications prior to your follow up visit, please call your surgeons office. Do not wait until your appointment time if you suspect complications. These will need to be addressed in the office promptly.

## 2024-10-23 ENCOUNTER — ANESTHESIA (OUTPATIENT)
Dept: PERIOP | Facility: HOSPITAL | Age: 74
End: 2024-10-23
Payer: MEDICARE

## 2024-10-23 ENCOUNTER — APPOINTMENT (OUTPATIENT)
Dept: ULTRASOUND IMAGING | Facility: HOSPITAL | Age: 74
End: 2024-10-23
Payer: MEDICARE

## 2024-10-23 ENCOUNTER — APPOINTMENT (OUTPATIENT)
Dept: GENERAL RADIOLOGY | Facility: HOSPITAL | Age: 74
End: 2024-10-23
Payer: MEDICARE

## 2024-10-23 ENCOUNTER — HOSPITAL ENCOUNTER (OUTPATIENT)
Facility: HOSPITAL | Age: 74
Discharge: HOME | End: 2024-10-24
Attending: INTERNAL MEDICINE | Admitting: INTERNAL MEDICINE
Payer: MEDICARE

## 2024-10-23 DIAGNOSIS — Z96.652 S/P TOTAL KNEE ARTHROPLASTY, LEFT: Primary | ICD-10-CM

## 2024-10-23 DIAGNOSIS — M17.12 PRIMARY OSTEOARTHRITIS OF LEFT KNEE: ICD-10-CM

## 2024-10-23 PROCEDURE — 25010000002 DEXAMETHASONE PER 1 MG: Performed by: NURSE ANESTHETIST, CERTIFIED REGISTERED

## 2024-10-23 PROCEDURE — G0378 HOSPITAL OBSERVATION PER HR: HCPCS

## 2024-10-23 PROCEDURE — 63710000001 ACETAMINOPHEN EXTRA STRENGTH 500 MG TABLET: Performed by: INTERNAL MEDICINE

## 2024-10-23 PROCEDURE — 63710000001 OXYCODONE 5 MG TABLET: Performed by: INTERNAL MEDICINE

## 2024-10-23 PROCEDURE — A9270 NON-COVERED ITEM OR SERVICE: HCPCS | Performed by: NURSE ANESTHETIST, CERTIFIED REGISTERED

## 2024-10-23 PROCEDURE — 25010000002 PROPOFOL 1000 MG/100ML EMULSION: Performed by: ANESTHESIOLOGY

## 2024-10-23 PROCEDURE — 25010000002 CEFAZOLIN PER 500 MG: Performed by: INTERNAL MEDICINE

## 2024-10-23 PROCEDURE — A9270 NON-COVERED ITEM OR SERVICE: HCPCS | Performed by: INTERNAL MEDICINE

## 2024-10-23 PROCEDURE — 27447 TOTAL KNEE ARTHROPLASTY: CPT | Performed by: INTERNAL MEDICINE

## 2024-10-23 PROCEDURE — 63710000001 MELOXICAM 7.5 MG TABLET: Performed by: INTERNAL MEDICINE

## 2024-10-23 PROCEDURE — 25010000002 BUPIVACAINE (PF) 0.25 % SOLUTION: Performed by: ANESTHESIOLOGY

## 2024-10-23 PROCEDURE — 25010000002 LIDOCAINE 2% SOLUTION: Performed by: ANESTHESIOLOGY

## 2024-10-23 PROCEDURE — 25010000002 EPINEPHRINE 1 MG/ML SOLUTION 1 ML VIAL: Performed by: INTERNAL MEDICINE

## 2024-10-23 PROCEDURE — C1776 JOINT DEVICE (IMPLANTABLE): HCPCS | Performed by: INTERNAL MEDICINE

## 2024-10-23 PROCEDURE — 25010000002 MIDAZOLAM PER 1MG: Performed by: NURSE ANESTHETIST, CERTIFIED REGISTERED

## 2024-10-23 PROCEDURE — 63710000001 ATORVASTATIN 40 MG TABLET: Performed by: INTERNAL MEDICINE

## 2024-10-23 PROCEDURE — 63710000001 POVIDONE-IODINE 10 % SOLUTION 118 ML BOTTLE: Performed by: INTERNAL MEDICINE

## 2024-10-23 PROCEDURE — 73560 X-RAY EXAM OF KNEE 1 OR 2: CPT

## 2024-10-23 PROCEDURE — 94799 UNLISTED PULMONARY SVC/PX: CPT

## 2024-10-23 PROCEDURE — 25810000003 LACTATED RINGERS PER 1000 ML: Performed by: NURSE ANESTHETIST, CERTIFIED REGISTERED

## 2024-10-23 PROCEDURE — 20985 CPTR-ASST DIR MS PX: CPT | Performed by: INTERNAL MEDICINE

## 2024-10-23 PROCEDURE — 99214 OFFICE O/P EST MOD 30 MIN: CPT | Performed by: FAMILY MEDICINE

## 2024-10-23 PROCEDURE — S0260 H&P FOR SURGERY: HCPCS | Performed by: INTERNAL MEDICINE

## 2024-10-23 PROCEDURE — C1713 ANCHOR/SCREW BN/BN,TIS/BN: HCPCS | Performed by: INTERNAL MEDICINE

## 2024-10-23 PROCEDURE — 63710000001 ESCITALOPRAM 10 MG TABLET: Performed by: INTERNAL MEDICINE

## 2024-10-23 PROCEDURE — 25010000002 KETOROLAC TROMETHAMINE PER 15 MG: Performed by: INTERNAL MEDICINE

## 2024-10-23 PROCEDURE — 25010000002 ONDANSETRON PER 1 MG: Performed by: NURSE ANESTHETIST, CERTIFIED REGISTERED

## 2024-10-23 PROCEDURE — 63710000001 ACETAMINOPHEN EXTRA STRENGTH 500 MG TABLET: Performed by: NURSE ANESTHETIST, CERTIFIED REGISTERED

## 2024-10-23 PROCEDURE — 94761 N-INVAS EAR/PLS OXIMETRY MLT: CPT

## 2024-10-23 PROCEDURE — 63710000001 PREGABALIN 75 MG CAPSULE: Performed by: INTERNAL MEDICINE

## 2024-10-23 PROCEDURE — 27447 TOTAL KNEE ARTHROPLASTY: CPT | Performed by: SPECIALIST/TECHNOLOGIST, OTHER

## 2024-10-23 PROCEDURE — 20985 CPTR-ASST DIR MS PX: CPT | Performed by: SPECIALIST/TECHNOLOGIST, OTHER

## 2024-10-23 PROCEDURE — 25010000002 BUPIVACAINE 0.5 % SOLUTION: Performed by: ANESTHESIOLOGY

## 2024-10-23 PROCEDURE — 25010000002 ROPIVACAINE PER 1 MG: Performed by: INTERNAL MEDICINE

## 2024-10-23 DEVICE — LEGION NARROW CRUCIATE RETAINING                                    OXINIUM SIZE 4N LEFT
Type: IMPLANTABLE DEVICE | Site: KNEE | Status: FUNCTIONAL
Brand: LEGION

## 2024-10-23 DEVICE — IMPLANTABLE DEVICE: Type: IMPLANTABLE DEVICE | Site: KNEE | Status: FUNCTIONAL

## 2024-10-23 DEVICE — KNOTLESS TISSUE CONTROL DEVICE, UNDYED UNIDIRECTIONAL (ANTIBACTERIAL) SYNTHETIC ABSORBABLE DEVICE
Type: IMPLANTABLE DEVICE | Site: KNEE | Status: FUNCTIONAL
Brand: STRATAFIX

## 2024-10-23 DEVICE — GENESIS II NON-POROUS TIBIAL                                    BASEPLATE SIZE 2 LEFT
Type: IMPLANTABLE DEVICE | Site: KNEE | Status: FUNCTIONAL
Brand: GENESIS II

## 2024-10-23 DEVICE — STRATAFIX (SPIRAL PDS PLUS), BIDIRECTIONAL
Type: IMPLANTABLE DEVICE | Site: KNEE | Status: FUNCTIONAL
Brand: STRATAFIX

## 2024-10-23 DEVICE — LEGION CRUCIATE RETAINING HIGH                                    FLEX HIGHLY CROSS LINKED                                    POLYETHYLENE SIZE 1-2 9MM
Type: IMPLANTABLE DEVICE | Site: KNEE | Status: FUNCTIONAL
Brand: LEGION

## 2024-10-23 DEVICE — PALACOS® MV+G IS INDICATED FOR USE AS BONE CEMENT IN ARTHROPLASTY PROCEDURES OF THE HIP, KNEE AND OTHER JOINTS TO FIX PLASTIC AND METAL PROSTHETIC PARTS TO LIVING BONE WHEN RECONSTRUCTION IS NECESSARY. THE CEMENT IS INDICATED FOR USE IN THE SECOND STAGE OF A TWO STAGE REVISION FOR TOTAL JOINT ARTHROPLASTY AFTER THE INITIAL INFECTION HAS BEEN CLEARED.PALACOS® MV+G CONTAINS THE X-RAY CONTRAST MEDIUM ZIRCONIUM DIOXIDE. TO IMPROVE VISIBILITY IN THE SURGICAL FIELD PALACOS® MV+G HAS BEEN COLOURED WITH CHLOROPHYLL (E141).THE BONE CEMENT IS PREPARED DIRECTLY BEFORE USE BY MIXING A POLYMER POWDER COMPONENT WITH A LIQUID MONOMER COMPONENT. A DUCTILE DOUGH FORMS WHICH CURES WITHIN A FEW MINUTES.
Type: IMPLANTABLE DEVICE | Site: KNEE | Status: FUNCTIONAL
Brand: PALACOS®

## 2024-10-23 RX ORDER — LIDOCAINE HYDROCHLORIDE 20 MG/ML
INJECTION, SOLUTION INFILTRATION; PERINEURAL AS NEEDED
Status: DISCONTINUED | OUTPATIENT
Start: 2024-10-23 | End: 2024-10-23 | Stop reason: SURG

## 2024-10-23 RX ORDER — SODIUM CHLORIDE, SODIUM LACTATE, POTASSIUM CHLORIDE, CALCIUM CHLORIDE 600; 310; 30; 20 MG/100ML; MG/100ML; MG/100ML; MG/100ML
9 INJECTION, SOLUTION INTRAVENOUS CONTINUOUS PRN
Status: DISCONTINUED | OUTPATIENT
Start: 2024-10-23 | End: 2024-10-23 | Stop reason: HOSPADM

## 2024-10-23 RX ORDER — SODIUM CHLORIDE, SODIUM LACTATE, POTASSIUM CHLORIDE, CALCIUM CHLORIDE 600; 310; 30; 20 MG/100ML; MG/100ML; MG/100ML; MG/100ML
100 INJECTION, SOLUTION INTRAVENOUS ONCE
Status: DISCONTINUED | OUTPATIENT
Start: 2024-10-23 | End: 2024-10-23 | Stop reason: HOSPADM

## 2024-10-23 RX ORDER — DEXMEDETOMIDINE HYDROCHLORIDE 100 UG/ML
INJECTION, SOLUTION INTRAVENOUS
Status: COMPLETED | OUTPATIENT
Start: 2024-10-23 | End: 2024-10-23

## 2024-10-23 RX ORDER — NALOXONE HCL 0.4 MG/ML
0.4 VIAL (ML) INJECTION
Status: DISCONTINUED | OUTPATIENT
Start: 2024-10-23 | End: 2024-10-24 | Stop reason: HOSPADM

## 2024-10-23 RX ORDER — BUPIVACAINE HYDROCHLORIDE 5 MG/ML
INJECTION, SOLUTION PERINEURAL
Status: COMPLETED | OUTPATIENT
Start: 2024-10-23 | End: 2024-10-23

## 2024-10-23 RX ORDER — DEXAMETHASONE SODIUM PHOSPHATE 4 MG/ML
4 INJECTION, SOLUTION INTRA-ARTICULAR; INTRALESIONAL; INTRAMUSCULAR; INTRAVENOUS; SOFT TISSUE ONCE AS NEEDED
Status: COMPLETED | OUTPATIENT
Start: 2024-10-23 | End: 2024-10-23

## 2024-10-23 RX ORDER — MEPERIDINE HYDROCHLORIDE 25 MG/ML
12.5 INJECTION INTRAMUSCULAR; INTRAVENOUS; SUBCUTANEOUS
Status: DISCONTINUED | OUTPATIENT
Start: 2024-10-23 | End: 2024-10-23 | Stop reason: HOSPADM

## 2024-10-23 RX ORDER — ONDANSETRON 2 MG/ML
4 INJECTION INTRAMUSCULAR; INTRAVENOUS ONCE AS NEEDED
Status: COMPLETED | OUTPATIENT
Start: 2024-10-23 | End: 2024-10-23

## 2024-10-23 RX ORDER — PROPOFOL 10 MG/ML
INJECTION, EMULSION INTRAVENOUS CONTINUOUS PRN
Status: DISCONTINUED | OUTPATIENT
Start: 2024-10-23 | End: 2024-10-23 | Stop reason: SURG

## 2024-10-23 RX ORDER — ACETAMINOPHEN 500 MG
1000 TABLET ORAL 3 TIMES DAILY
Status: DISCONTINUED | OUTPATIENT
Start: 2024-10-23 | End: 2024-10-24 | Stop reason: HOSPADM

## 2024-10-23 RX ORDER — MORPHINE SULFATE 2 MG/ML
2 INJECTION, SOLUTION INTRAMUSCULAR; INTRAVENOUS
Status: DISCONTINUED | OUTPATIENT
Start: 2024-10-23 | End: 2024-10-24 | Stop reason: HOSPADM

## 2024-10-23 RX ORDER — ATORVASTATIN CALCIUM 40 MG/1
40 TABLET, FILM COATED ORAL NIGHTLY
Status: DISCONTINUED | OUTPATIENT
Start: 2024-10-23 | End: 2024-10-24 | Stop reason: HOSPADM

## 2024-10-23 RX ORDER — PREGABALIN 75 MG/1
150 CAPSULE ORAL ONCE
Status: COMPLETED | OUTPATIENT
Start: 2024-10-23 | End: 2024-10-23

## 2024-10-23 RX ORDER — METOPROLOL SUCCINATE 50 MG/1
50 TABLET, EXTENDED RELEASE ORAL DAILY
Status: DISCONTINUED | OUTPATIENT
Start: 2024-10-23 | End: 2024-10-24 | Stop reason: HOSPADM

## 2024-10-23 RX ORDER — MELOXICAM 7.5 MG/1
15 TABLET ORAL DAILY
Status: DISCONTINUED | OUTPATIENT
Start: 2024-10-24 | End: 2024-10-24 | Stop reason: HOSPADM

## 2024-10-23 RX ORDER — ASPIRIN 81 MG/1
81 TABLET ORAL EVERY 12 HOURS SCHEDULED
Status: DISCONTINUED | OUTPATIENT
Start: 2024-10-24 | End: 2024-10-24 | Stop reason: HOSPADM

## 2024-10-23 RX ORDER — ONDANSETRON 4 MG/1
4 TABLET, FILM COATED ORAL EVERY 8 HOURS PRN
Qty: 30 TABLET | Refills: 0 | Status: SHIPPED | OUTPATIENT
Start: 2024-10-23

## 2024-10-23 RX ORDER — ONDANSETRON 4 MG/1
4 TABLET, ORALLY DISINTEGRATING ORAL EVERY 6 HOURS PRN
Status: DISCONTINUED | OUTPATIENT
Start: 2024-10-23 | End: 2024-10-24 | Stop reason: HOSPADM

## 2024-10-23 RX ORDER — FAMOTIDINE 20 MG/1
40 TABLET, FILM COATED ORAL DAILY
Status: DISCONTINUED | OUTPATIENT
Start: 2024-10-24 | End: 2024-10-24 | Stop reason: HOSPADM

## 2024-10-23 RX ORDER — MELOXICAM 7.5 MG/1
15 TABLET ORAL ONCE
Status: COMPLETED | OUTPATIENT
Start: 2024-10-23 | End: 2024-10-23

## 2024-10-23 RX ORDER — ONDANSETRON 2 MG/ML
4 INJECTION INTRAMUSCULAR; INTRAVENOUS EVERY 6 HOURS PRN
Status: DISCONTINUED | OUTPATIENT
Start: 2024-10-23 | End: 2024-10-24 | Stop reason: HOSPADM

## 2024-10-23 RX ORDER — SODIUM CHLORIDE 0.9 % (FLUSH) 0.9 %
10 SYRINGE (ML) INJECTION AS NEEDED
Status: DISCONTINUED | OUTPATIENT
Start: 2024-10-23 | End: 2024-10-23 | Stop reason: HOSPADM

## 2024-10-23 RX ORDER — OXYCODONE AND ACETAMINOPHEN 5; 325 MG/1; MG/1
1 TABLET ORAL EVERY 4 HOURS PRN
Qty: 30 TABLET | Refills: 0 | Status: SHIPPED | OUTPATIENT
Start: 2024-10-23

## 2024-10-23 RX ORDER — TRANEXAMIC ACID 10 MG/ML
1000 INJECTION, SOLUTION INTRAVENOUS ONCE
Status: COMPLETED | OUTPATIENT
Start: 2024-10-23 | End: 2024-10-23

## 2024-10-23 RX ORDER — ACETAMINOPHEN 500 MG
500 TABLET ORAL ONCE
Status: COMPLETED | OUTPATIENT
Start: 2024-10-23 | End: 2024-10-23

## 2024-10-23 RX ORDER — SODIUM CHLORIDE 0.9 % (FLUSH) 0.9 %
10 SYRINGE (ML) INJECTION EVERY 12 HOURS SCHEDULED
Status: DISCONTINUED | OUTPATIENT
Start: 2024-10-23 | End: 2024-10-23 | Stop reason: HOSPADM

## 2024-10-23 RX ORDER — ONDANSETRON 2 MG/ML
4 INJECTION INTRAMUSCULAR; INTRAVENOUS ONCE AS NEEDED
Status: DISCONTINUED | OUTPATIENT
Start: 2024-10-23 | End: 2024-10-23 | Stop reason: HOSPADM

## 2024-10-23 RX ORDER — TRANEXAMIC ACID 10 MG/ML
1000 INJECTION, SOLUTION INTRAVENOUS ONCE
Status: DISCONTINUED | OUTPATIENT
Start: 2024-10-23 | End: 2024-10-23 | Stop reason: HOSPADM

## 2024-10-23 RX ORDER — AMOXICILLIN 250 MG
1 CAPSULE ORAL DAILY
Qty: 30 TABLET | Refills: 0 | Status: SHIPPED | OUTPATIENT
Start: 2024-10-23

## 2024-10-23 RX ORDER — BUPIVACAINE HYDROCHLORIDE 2.5 MG/ML
INJECTION, SOLUTION EPIDURAL; INFILTRATION; INTRACAUDAL
Status: COMPLETED | OUTPATIENT
Start: 2024-10-23 | End: 2024-10-23

## 2024-10-23 RX ORDER — OXYCODONE HYDROCHLORIDE 5 MG/1
10 TABLET ORAL EVERY 4 HOURS PRN
Status: DISCONTINUED | OUTPATIENT
Start: 2024-10-23 | End: 2024-10-24 | Stop reason: HOSPADM

## 2024-10-23 RX ORDER — MIDAZOLAM HYDROCHLORIDE 2 MG/2ML
0.5 INJECTION, SOLUTION INTRAMUSCULAR; INTRAVENOUS
Status: COMPLETED | OUTPATIENT
Start: 2024-10-23 | End: 2024-10-23

## 2024-10-23 RX ORDER — FAMOTIDINE 10 MG/ML
20 INJECTION, SOLUTION INTRAVENOUS
Status: COMPLETED | OUTPATIENT
Start: 2024-10-23 | End: 2024-10-23

## 2024-10-23 RX ORDER — METOPROLOL TARTRATE 1 MG/ML
INJECTION, SOLUTION INTRAVENOUS AS NEEDED
Status: DISCONTINUED | OUTPATIENT
Start: 2024-10-23 | End: 2024-10-23 | Stop reason: SURG

## 2024-10-23 RX ORDER — ESCITALOPRAM OXALATE 10 MG/1
10 TABLET ORAL DAILY
Status: DISCONTINUED | OUTPATIENT
Start: 2024-10-23 | End: 2024-10-24 | Stop reason: HOSPADM

## 2024-10-23 RX ORDER — OXYCODONE HYDROCHLORIDE 5 MG/1
5 TABLET ORAL EVERY 4 HOURS PRN
Status: DISCONTINUED | OUTPATIENT
Start: 2024-10-23 | End: 2024-10-24 | Stop reason: HOSPADM

## 2024-10-23 RX ADMIN — PREGABALIN 150 MG: 75 CAPSULE ORAL at 10:43

## 2024-10-23 RX ADMIN — CEFAZOLIN 2 G: 2 INJECTION, POWDER, FOR SOLUTION INTRAVENOUS at 12:34

## 2024-10-23 RX ADMIN — DEXMEDETOMIDINE 25 MCG: 100 INJECTION, SOLUTION, CONCENTRATE INTRAVENOUS at 11:58

## 2024-10-23 RX ADMIN — TRANEXAMIC ACID 1000 MG: 10 INJECTION, SOLUTION INTRAVENOUS at 13:22

## 2024-10-23 RX ADMIN — BUPIVACAINE HYDROCHLORIDE 18 ML: 2.5 INJECTION, SOLUTION EPIDURAL; INFILTRATION; INTRACAUDAL at 12:02

## 2024-10-23 RX ADMIN — ATORVASTATIN CALCIUM 40 MG: 40 TABLET, FILM COATED ORAL at 20:55

## 2024-10-23 RX ADMIN — MIDAZOLAM HYDROCHLORIDE 0.5 MG: 1 INJECTION, SOLUTION INTRAMUSCULAR; INTRAVENOUS at 11:56

## 2024-10-23 RX ADMIN — OXYCODONE HYDROCHLORIDE 5 MG: 5 TABLET ORAL at 17:08

## 2024-10-23 RX ADMIN — ONDANSETRON 4 MG: 2 INJECTION INTRAMUSCULAR; INTRAVENOUS at 10:46

## 2024-10-23 RX ADMIN — DEXMEDETOMIDINE HYDROCHLORIDE 15 MCG: 100 INJECTION, SOLUTION INTRAVENOUS at 12:02

## 2024-10-23 RX ADMIN — LIDOCAINE HYDROCHLORIDE 60 MG: 20 INJECTION, SOLUTION INFILTRATION; PERINEURAL at 12:33

## 2024-10-23 RX ADMIN — BUPIVACAINE HYDROCHLORIDE 2 ML: 5 INJECTION, SOLUTION PERINEURAL at 12:12

## 2024-10-23 RX ADMIN — ACETAMINOPHEN 500 MG: 500 TABLET ORAL at 10:43

## 2024-10-23 RX ADMIN — ESCITALOPRAM OXALATE 10 MG: 10 TABLET, FILM COATED ORAL at 17:08

## 2024-10-23 RX ADMIN — SODIUM CHLORIDE, POTASSIUM CHLORIDE, SODIUM LACTATE AND CALCIUM CHLORIDE 9 ML/HR: 600; 310; 30; 20 INJECTION, SOLUTION INTRAVENOUS at 10:46

## 2024-10-23 RX ADMIN — MELOXICAM 15 MG: 7.5 TABLET ORAL at 10:43

## 2024-10-23 RX ADMIN — PROPOFOL INJECTABLE EMULSION 75 MCG/KG/MIN: 10 INJECTION, EMULSION INTRAVENOUS at 12:33

## 2024-10-23 RX ADMIN — METOPROLOL TARTRATE 2 MG: 5 INJECTION INTRAVENOUS at 12:51

## 2024-10-23 RX ADMIN — ACETAMINOPHEN 1000 MG: 500 TABLET ORAL at 20:55

## 2024-10-23 RX ADMIN — BUPIVACAINE HYDROCHLORIDE 12 ML: 2.5 INJECTION, SOLUTION EPIDURAL; INFILTRATION; INTRACAUDAL; PERINEURAL at 11:58

## 2024-10-23 RX ADMIN — FAMOTIDINE 20 MG: 10 INJECTION, SOLUTION INTRAVENOUS at 10:46

## 2024-10-23 RX ADMIN — TRANEXAMIC ACID 1000 MG: 10 INJECTION, SOLUTION INTRAVENOUS at 12:39

## 2024-10-23 RX ADMIN — DEXAMETHASONE SODIUM PHOSPHATE 4 MG: 4 INJECTION, SOLUTION INTRAMUSCULAR; INTRAVENOUS at 10:46

## 2024-10-23 RX ADMIN — ACETAMINOPHEN 1000 MG: 500 TABLET ORAL at 17:08

## 2024-10-23 RX ADMIN — MIDAZOLAM HYDROCHLORIDE 0.5 MG: 1 INJECTION, SOLUTION INTRAMUSCULAR; INTRAVENOUS at 11:41

## 2024-10-23 RX ADMIN — CEFAZOLIN 2000 MG: 2 INJECTION, POWDER, FOR SOLUTION INTRAVENOUS at 20:55

## 2024-10-23 NOTE — ANESTHESIA PREPROCEDURE EVALUATION
Anesthesia Evaluation     Patient summary reviewed and Nursing notes reviewed   NPO Solid Status: > 8 hours  NPO Liquid Status: > 4 hours           Airway   Mallampati: I  TM distance: >3 FB  Neck ROM: full  Dental - normal exam     Pulmonary    (+) a smoker (none for over 25 years) Former, cigarettes, COPD (trelogy, used this am) mild,sleep apnea on CPAP  Cardiovascular - normal exam  Exercise tolerance: good (4-7 METS)    PT is on anticoagulation therapy  Patient on routine beta blocker and Beta blocker not taken-may be given intraoperatively  Rhythm: regular  Rate: normal    (+) hypertension well controlled less than 2 medications, CAD, cardiac stents (one  placed  12 yrs ago) Drug eluting stent more than 12 months ago , PVD (PAD), hyperlipidemia      Neuro/Psych  (+) psychiatric history Anxiety and DepressionNumbness: denies.  GI/Hepatic/Renal/Endo    (+) obesity    Musculoskeletal     Abdominal    Substance History - negative use     OB/GYN negative ob/gyn ROS         Other   arthritis (knees, hands),                 Anesthesia Plan    ASA 2     MAC, regional and spinal     intravenous induction     Anesthetic plan, risks, benefits, and alternatives have been provided, discussed and informed consent has been obtained with: patient and child.  Pre-procedure education provided  Use of blood products discussed with patient and child  Consented to blood products.    Plan discussed with CRNA.    CODE STATUS:

## 2024-10-23 NOTE — ADDENDUM NOTE
Addendum  created 10/23/24 1515 by Kodak Tinoco, CRNA    Review and Sign - Ready for Procedure

## 2024-10-23 NOTE — ANESTHESIA PROCEDURE NOTES
Peripheral Block    Pre-sedation assessment completed: 10/23/2024 11:41 AM    Patient reassessed immediately prior to procedure    Patient location during procedure: pre-op  Start time: 10/23/2024 11:56 AM  Stop time: 10/23/2024 11:58 AM  Reason for block: at surgeon's request and post-op pain management  Performed by  Anesthesiologist: Thao Herrera MDSRNA: Hero Olmstead SRNA  Preanesthetic Checklist  Completed: patient identified, IV checked, site marked, risks and benefits discussed, surgical consent, monitors and equipment checked, pre-op evaluation and timeout performed  Prep:  Pt Position: supine  Sterile barriers:cap, gloves, mask and washed/disinfected hands  Prep: ChloraPrep  Patient monitoring: blood pressure monitoring, continuous pulse oximetry and EKG  Procedure    Sedation: yes  Performed under: local infiltration  Guidance:ultrasound guided    ULTRASOUND INTERPRETATION.  Using ultrasound guidance a 21 G gauge needle was placed in close proximity to the nerve, at which point, under ultrasound guidance anesthetic was injected in the area of the nerve and spread of the anesthesia was seen on ultrasound in close proximity thereto.  There were no abnormalities seen on ultrasound; a digital image was taken; and the patient tolerated the procedure with no complications. Images:still images obtained, printed/placed on chart    Laterality:left  Block Type:adductor canal block  Injection Technique:single-shot  Needle Type:echogenic  Needle Gauge:21 G  Resistance on Injection: none    Medications Used: dexmedetomidine HCl (PRECEDEX) injection - Perineural   25 mcg - 10/23/2024 11:58:00 AM  bupivacaine PF (MARCAINE) injection 0.25% - Perineural   12 mL - 10/23/2024 11:58:00 AM      Post Assessment  Injection Assessment: negative aspiration for heme, no paresthesia on injection and incremental injection  Patient Tolerance:comfortable throughout block  Complications:no  Performed by: Hero Olmstead,  SRNA

## 2024-10-23 NOTE — ANESTHESIA PROCEDURE NOTES
Spinal Block    Pre-sedation assessment completed: 10/23/2024 11:41 AM    Patient reassessed immediately prior to procedure    Patient location during procedure: pre-op  Start Time: 10/23/2024 12:09 PM  Stop Time: 10/23/2024 12:12 PM  Indication:at surgeon's request and post-op pain management  Performed By  Anesthesiologist: Thao Herrera MDSRNA: Hero Olmstead SRNA  Preanesthetic Checklist  Completed: patient identified, IV checked, site marked, risks and benefits discussed, surgical consent, monitors and equipment checked, pre-op evaluation and timeout performed  Spinal Block Prep:  Patient Position:sitting  Sterile Tech:cap, gloves, mask and sterile barriers  Prep:Chloraprep  Patient Monitoring:blood pressure monitoring, continuous pulse oximetry and EKG    Spinal Block Procedure  Approach:midline  Guidance:landmark technique and palpation technique  Location:L3-L4  Needle Type:Sprotte  Needle Gauge:25 G  Placement of Spinal needle event:cerebrospinal fluid aspirated  Paresthesia: no  Fluid Appearance:clear  Medications: bupivacaine (MARCAINE) injection 0.5% - Injection   2 mL - 10/23/2024 12:12:00 PM   Post Assessment  Patient Tolerance:patient tolerated the procedure well with no apparent complications  Complications no

## 2024-10-23 NOTE — PLAN OF CARE
Goal Outcome Evaluation:           Progress: improving  Outcome Evaluation: post op knee, spinal affecting mobility. plan of care explained to patient and encouraged verbalization of need for pain meds.

## 2024-10-23 NOTE — INTERVAL H&P NOTE
H&P reviewed. The patient was examined and there are no changes to the H&P.  /81 (BP Location: Left arm, Patient Position: Lying)   Pulse 70   Temp 98.3 °F (36.8 °C) (Oral)   Resp 15   Wt 87.4 kg (192 lb 9.6 oz)   SpO2 96%   BMI 34.12 kg/m²

## 2024-10-23 NOTE — ANESTHESIA PROCEDURE NOTES
Peripheral Block    Pre-sedation assessment completed: 10/23/2024 11:41 AM    Patient reassessed immediately prior to procedure    Patient location during procedure: pre-op  Start time: 10/23/2024 12:00 PM  Stop time: 10/23/2024 12:02 PM  Reason for block: at surgeon's request and post-op pain management  Performed by  Anesthesiologist: Thao Herrera MDSRNA: Hero Olmstead SRNA  Preanesthetic Checklist  Completed: patient identified, IV checked, site marked, risks and benefits discussed, surgical consent, monitors and equipment checked, pre-op evaluation and timeout performed  Prep:  Pt Position: supine  Sterile barriers:cap, gloves, mask and washed/disinfected hands  Prep: ChloraPrep  Patient monitoring: blood pressure monitoring, continuous pulse oximetry and EKG  Procedure    Sedation: yes  Performed under: local infiltration  Guidance:ultrasound guided    ULTRASOUND INTERPRETATION.  Using ultrasound guidance a 21 G gauge needle was placed in close proximity to the nerve, at which point, under ultrasound guidance anesthetic was injected in the area of the nerve and spread of the anesthesia was seen on ultrasound in close proximity thereto.  There were no abnormalities seen on ultrasound; a digital image was taken; and the patient tolerated the procedure with no complications. Images:still images obtained, printed/placed on chart    Laterality:left  Block Type:iPack  Injection Technique:single-shot  Needle Type:echogenic  Needle Gauge:21 G  Resistance on Injection: none    Medications Used: bupivacaine PF (MARCAINE) injection 0.25% - Perineural   18 mL - 10/23/2024 12:02:00 PM  dexmedetomidine HCl (PRECEDEX) injection - Perineural   15 mcg - 10/23/2024 12:02:00 PM      Post Assessment  Injection Assessment: negative aspiration for heme, no paresthesia on injection and incremental injection  Patient Tolerance:comfortable throughout block  Complications:no  Performed by: Hero Olmstead, SRNA

## 2024-10-23 NOTE — ANESTHESIA POSTPROCEDURE EVALUATION
Patient: Edilma Doss    Procedure Summary       Date: 10/23/24 Room / Location:  [facility] /  [facility]    Anesthesia Start: 1227 Anesthesia Stop: 1353    Procedure: TOTAL KNEE ARTHROPLASTY WITH CORI ROBOT (Left: Knee) Diagnosis:       Primary osteoarthritis of left knee      (Primary osteoarthritis of left knee [M17.12])    Surgeons: Rowdy Gracia MD Provider: Thao Herrera MD    Anesthesia Type: MAC, regional, spinal ASA Status: 2            Anesthesia Type: MAC, regional, spinal    Vitals  Vitals Value Taken Time   /80 10/23/24 1435   Temp 97.6 °F (36.4 °C) 10/23/24 1400   Pulse 65 10/23/24 1442   Resp 16 10/23/24 1425   SpO2 95 % 10/23/24 1442   Vitals shown include unfiled device data.        Post Anesthesia Care and Evaluation    Patient location during evaluation: bedside  Patient participation: complete - patient participated  Level of consciousness: awake and alert  Pain score: 2  Pain management: adequate    Airway patency: patent  Anesthetic complications: No anesthetic complications  PONV Status: none  Cardiovascular status: acceptable  Respiratory status: acceptable  Hydration status: acceptable  No anesthesia care post op

## 2024-10-24 ENCOUNTER — READMISSION MANAGEMENT (OUTPATIENT)
Dept: CALL CENTER | Facility: HOSPITAL | Age: 74
End: 2024-10-24
Payer: MEDICARE

## 2024-10-24 VITALS
DIASTOLIC BLOOD PRESSURE: 75 MMHG | TEMPERATURE: 98.1 F | BODY MASS INDEX: 33.55 KG/M2 | HEIGHT: 63 IN | WEIGHT: 189.38 LBS | SYSTOLIC BLOOD PRESSURE: 135 MMHG | OXYGEN SATURATION: 94 % | HEART RATE: 67 BPM | RESPIRATION RATE: 18 BRPM

## 2024-10-24 LAB
ANION GAP SERPL CALCULATED.3IONS-SCNC: 10.1 MMOL/L (ref 5–15)
BASOPHILS # BLD AUTO: 0.01 10*3/MM3 (ref 0–0.2)
BASOPHILS NFR BLD AUTO: 0.1 % (ref 0–1.5)
BUN SERPL-MCNC: 25 MG/DL (ref 8–23)
BUN/CREAT SERPL: 26.9 (ref 7–25)
CALCIUM SPEC-SCNC: 9.2 MG/DL (ref 8.6–10.5)
CHLORIDE SERPL-SCNC: 109 MMOL/L (ref 98–107)
CO2 SERPL-SCNC: 21.9 MMOL/L (ref 22–29)
CREAT SERPL-MCNC: 0.93 MG/DL (ref 0.57–1)
DEPRECATED RDW RBC AUTO: 44.5 FL (ref 37–54)
EGFRCR SERPLBLD CKD-EPI 2021: 64.2 ML/MIN/1.73
EOSINOPHIL # BLD AUTO: 0 10*3/MM3 (ref 0–0.4)
EOSINOPHIL NFR BLD AUTO: 0 % (ref 0.3–6.2)
ERYTHROCYTE [DISTWIDTH] IN BLOOD BY AUTOMATED COUNT: 13.2 % (ref 12.3–15.4)
GLUCOSE SERPL-MCNC: 114 MG/DL (ref 65–99)
HCT VFR BLD AUTO: 34.7 % (ref 34–46.6)
HGB BLD-MCNC: 11 G/DL (ref 12–15.9)
IMM GRANULOCYTES # BLD AUTO: 0.06 10*3/MM3 (ref 0–0.05)
IMM GRANULOCYTES NFR BLD AUTO: 0.4 % (ref 0–0.5)
LYMPHOCYTES # BLD AUTO: 1.77 10*3/MM3 (ref 0.7–3.1)
LYMPHOCYTES NFR BLD AUTO: 12.9 % (ref 19.6–45.3)
MCH RBC QN AUTO: 29.4 PG (ref 26.6–33)
MCHC RBC AUTO-ENTMCNC: 31.7 G/DL (ref 31.5–35.7)
MCV RBC AUTO: 92.8 FL (ref 79–97)
MONOCYTES # BLD AUTO: 0.92 10*3/MM3 (ref 0.1–0.9)
MONOCYTES NFR BLD AUTO: 6.7 % (ref 5–12)
NEUTROPHILS NFR BLD AUTO: 10.98 10*3/MM3 (ref 1.7–7)
NEUTROPHILS NFR BLD AUTO: 79.9 % (ref 42.7–76)
NRBC BLD AUTO-RTO: 0 /100 WBC (ref 0–0.2)
PLATELET # BLD AUTO: 193 10*3/MM3 (ref 140–450)
PMV BLD AUTO: 10.2 FL (ref 6–12)
POTASSIUM SERPL-SCNC: 4.2 MMOL/L (ref 3.5–5.2)
RBC # BLD AUTO: 3.74 10*6/MM3 (ref 3.77–5.28)
SODIUM SERPL-SCNC: 141 MMOL/L (ref 136–145)
WBC NRBC COR # BLD AUTO: 13.74 10*3/MM3 (ref 3.4–10.8)

## 2024-10-24 PROCEDURE — 25010000002 HYDRALAZINE PER 20 MG: Performed by: FAMILY MEDICINE

## 2024-10-24 PROCEDURE — A9270 NON-COVERED ITEM OR SERVICE: HCPCS | Performed by: INTERNAL MEDICINE

## 2024-10-24 PROCEDURE — 63710000001 ACETAMINOPHEN EXTRA STRENGTH 500 MG TABLET: Performed by: INTERNAL MEDICINE

## 2024-10-24 PROCEDURE — 63710000001 METOPROLOL SUCCINATE XL 50 MG TABLET SUSTAINED-RELEASE 24 HOUR: Performed by: INTERNAL MEDICINE

## 2024-10-24 PROCEDURE — 63710000001 MELOXICAM 7.5 MG TABLET: Performed by: INTERNAL MEDICINE

## 2024-10-24 PROCEDURE — 25010000002 CEFAZOLIN PER 500 MG: Performed by: INTERNAL MEDICINE

## 2024-10-24 PROCEDURE — 80048 BASIC METABOLIC PNL TOTAL CA: CPT | Performed by: INTERNAL MEDICINE

## 2024-10-24 PROCEDURE — 97161 PT EVAL LOW COMPLEX 20 MIN: CPT

## 2024-10-24 PROCEDURE — 97165 OT EVAL LOW COMPLEX 30 MIN: CPT

## 2024-10-24 PROCEDURE — 63710000001 FAMOTIDINE 20 MG TABLET: Performed by: INTERNAL MEDICINE

## 2024-10-24 PROCEDURE — 85025 COMPLETE CBC W/AUTO DIFF WBC: CPT | Performed by: INTERNAL MEDICINE

## 2024-10-24 PROCEDURE — G0378 HOSPITAL OBSERVATION PER HR: HCPCS

## 2024-10-24 PROCEDURE — 63710000001 ESCITALOPRAM 10 MG TABLET: Performed by: INTERNAL MEDICINE

## 2024-10-24 PROCEDURE — 63710000001 ASPIRIN 81 MG TABLET DELAYED-RELEASE: Performed by: INTERNAL MEDICINE

## 2024-10-24 RX ORDER — HYDRALAZINE HYDROCHLORIDE 20 MG/ML
10 INJECTION INTRAMUSCULAR; INTRAVENOUS EVERY 4 HOURS PRN
Status: DISCONTINUED | OUTPATIENT
Start: 2024-10-24 | End: 2024-10-24 | Stop reason: HOSPADM

## 2024-10-24 RX ADMIN — FAMOTIDINE 40 MG: 20 TABLET, FILM COATED ORAL at 09:30

## 2024-10-24 RX ADMIN — MELOXICAM 15 MG: 7.5 TABLET ORAL at 09:30

## 2024-10-24 RX ADMIN — METOPROLOL SUCCINATE 50 MG: 50 TABLET, EXTENDED RELEASE ORAL at 09:30

## 2024-10-24 RX ADMIN — CEFAZOLIN 2000 MG: 2 INJECTION, POWDER, FOR SOLUTION INTRAVENOUS at 04:03

## 2024-10-24 RX ADMIN — ACETAMINOPHEN 1000 MG: 500 TABLET ORAL at 09:30

## 2024-10-24 RX ADMIN — ESCITALOPRAM OXALATE 10 MG: 10 TABLET, FILM COATED ORAL at 09:30

## 2024-10-24 RX ADMIN — HYDRALAZINE HYDROCHLORIDE 10 MG: 20 INJECTION INTRAMUSCULAR; INTRAVENOUS at 04:15

## 2024-10-24 RX ADMIN — ASPIRIN 81 MG: 81 TABLET, COATED ORAL at 09:30

## 2024-10-24 NOTE — PROGRESS NOTES
Patient: Edilma Doss  Procedure(s):  TOTAL KNEE ARTHROPLASTY WITH CORI ROBOT  Anesthesia type: MAC, regional, spinal    Patient location: [facility]  Last vitals:   Vitals:    10/24/24 0515   BP: 135/75   Pulse: 67   Resp: 18   Temp:    SpO2: 94%     Level of consciousness: awake, alert, and oriented    Post-anesthesia pain: adequate analgesia  Airway patency: patent  Respiratory: unassisted  Cardiovascular: stable and blood pressure at baseline  Hydration: euvolemic    Anesthetic complications: no

## 2024-10-24 NOTE — DISCHARGE SUMMARY
Orthopedic Discharge Summary      Patient: Edilma Doss      YOB: 1950    Medical Record Number: [id number]    Attending Physician: No att. providers found  Consulting Physician(s):   Date of Admission: 10/23/2024  9:55 AM  Date of Discharge: 10/24/2024    Active Hospital Problems    Diagnosis     **OA (osteoarthritis) of knee     S/P total knee arthroplasty, left       Status Post: [place] ARTHRP KNE CONDYLE&PLATU MEDIAL&LAT COMPARTMENTS [66608] (TOTAL KNEE ARTHROPLASTY WITH CORI ROBOT)      No Known Allergies    Current Medications:     Discharge Medications        New Medications        Instructions Start Date   ondansetron 4 MG tablet  Commonly known as: Zofran   4 mg, Oral, Every 8 Hours PRN      oxyCODONE-acetaminophen 5-325 MG per tablet  Commonly known as: PERCOCET   1 tablet, Oral, Every 4 Hours PRN      Stool Softener Plus Laxative 8.6-50 MG per tablet  Generic drug: sennosides-docusate   1 tablet, Oral, Daily             Continue These Medications        Instructions Start Date   atorvastatin 40 MG tablet  Commonly known as: LIPITOR   40 mg, Oral, Nightly      CINNAMON PO   Take  by mouth.      clotrimazole-betamethasone 1-0.05 % cream  Commonly known as: LOTRISONE   1 cream two times daily      escitalopram 10 MG tablet  Commonly known as: LEXAPRO   10 mg, Oral, Daily      ezetimibe 10 MG tablet  Commonly known as: ZETIA   10 mg, Oral, Daily      Flax Seed Oil 1000 MG capsule   1,000 mg      metoprolol succinate XL 50 MG 24 hr tablet  Commonly known as: TOPROL-XL   50 mg, Oral, Daily      Rybelsus 14 MG tablet  Generic drug: Semaglutide   14 mg, Oral, Daily      Trelegy Ellipta 100-62.5-25 MCG/ACT inhaler  Generic drug: Fluticasone-Umeclidin-Vilant   1 puff             ASK your doctor about these medications        Instructions Start Date   aspirin 81 MG EC tablet   81 mg, Oral, Daily      cholecalciferol 10 MCG (400 UNIT) tablet  Commonly known as: VITAMIN D3   400 Units      clopidogrel  75 MG tablet  Commonly known as: PLAVIX   75 mg, Daily      Magnesium 250 MG tablet   Take  by mouth.      nitroglycerin 0.4 MG SL tablet  Commonly known as: NITROSTAT   0.4 mg, Sublingual, Every 5 Minutes PRN, Take no more than 3 doses in 15 minutes.      vitamin C 250 MG tablet  Commonly known as: ASCORBIC ACID   500 mg, Daily                   Past Medical History:   Diagnosis Date    Allergic     Anxiety     Bursitis of hip hurts when walking and sleeping on side.    COPD (chronic obstructive pulmonary disease)     Coronary artery disease     stent placed    CTS (carpal tunnel syndrome) yes,  ?    Depression     Elevated cholesterol     Frozen shoulder     Hip arthrosis 1122/    pain when walking    Hyperlipidemia     Hypertension     Knee sprain twisted it Sore    Knee swelling     Obesity     Periarthritis of shoulder grinds    Sleep apnea     CPAP    Tear of meniscus of knee left knee, 5yrs. ago now right knee I think.     Past Surgical History:   Procedure Laterality Date    CHOLECYSTECTOMY      COLONOSCOPY      CORONARY STENT PLACEMENT      HAND SURGERY  both thumb joints from repitation movements    SHOULDER SURGERY Left     TONSILLECTOMY      TOTAL KNEE ARTHROPLASTY Left 10/23/2024    Procedure: TOTAL KNEE ARTHROPLASTY WITH CORI ROBOT;  Surgeon: Rowdy Gracia MD;  Location: [facility];  Service: Robotics - Ortho;  Laterality: Left;    TRIGGER POINT INJECTION  knleft knee    TUBAL ABDOMINAL LIGATION       Social History     Occupational History    Not on file   Tobacco Use    Smoking status: Former     Current packs/day: 0.00     Average packs/day: 2.0 packs/day for 32.9 years (65.9 ttl pk-yrs)     Types: Cigarettes     Start date: 1966     Quit date: 11/10/1999     Years since quittin.9     Passive exposure: Past    Smokeless tobacco: Never   Vaping Use    Vaping status: Never Used   Substance and Sexual Activity    Alcohol use: Never    Drug use: Never    Sexual activity: Not Currently      Partners: Male     Birth control/protection: Tubal ligation      Social History     Social History Narrative    Not on file     Family History   Problem Relation Age of Onset    Diabetes Mother     Breast cancer Father     Cancer Sister     Breast cancer Maternal Aunt          Physical Exam: 73 y.o. female  General Appearance:    Alert, cooperative, in no acute distress                      Vitals:    10/23/24 2322 10/24/24 0021 10/24/24 0333 10/24/24 0515   BP: 159/71  176/80 135/75   BP Location:    Left arm   Patient Position: Lying  Lying Lying   Pulse: 68 71 66 67   Resp: 18  20 18   Temp: 97.4 °F (36.3 °C)  98.1 °F (36.7 °C)    TempSrc: Oral  Oral    SpO2: 95% 94% 95% 94%   Weight:       Height:                  She is alert and oriented this morning. Afebrile. Hemodynamically stable. Dressing is dry. Left leg has good distal pulses no motor or sensory deficit and the calf is nontender. She has active dorsi and plantarflexion of the foot.     Hospital Course:  73 y.o. female admitted to [facility] to services of Rowdy Gracia MD with Primary osteoarthritis of left knee [M17.12]  S/P total knee arthroplasty, left [Z96.652]  OA (osteoarthritis) of knee [M17.9] on 10/23/2024 and underwent [place] ARTHRP KNE CONDYLE&PLATU MEDIAL&LAT COMPARTMENTS [88584] (TOTAL KNEE ARTHROPLASTY WITH CORI ROBOT)  Per Rowdy Gracia MD. Antibiotic and VTE prophylaxis were per SCIP protocols. Post-operatively the patient transferred to the post-operative floor where the patient underwent mobilization therapy that included active as well as passive ROM exercises. Opioids were titrated to achieve appropriate pain management to allow for participation in mobilization exercises. Vital signs are now stable. The incision is intact without signs or symptoms of infection. Operative extremity neurovascular status remains intact.   Appropriate education re: incision care, activity levels, medications, and follow up visits was  completed and all questions were answered. The patient is now deemed stable for discharge to Home.      DIAGNOSTIC TESTS:     Admission on 10/23/2024, Discharged on 10/24/2024   Component Date Value Ref Range Status    Glucose 10/24/2024 114 (H)  65 - 99 mg/dL Final    BUN 10/24/2024 25 (H)  8 - 23 mg/dL Final    Creatinine 10/24/2024 0.93  0.57 - 1.00 mg/dL Final    Sodium 10/24/2024 141  136 - 145 mmol/L Final    Potassium 10/24/2024 4.2  3.5 - 5.2 mmol/L Final    Chloride 10/24/2024 109 (H)  98 - 107 mmol/L Final    CO2 10/24/2024 21.9 (L)  22.0 - 29.0 mmol/L Final    Calcium 10/24/2024 9.2  8.6 - 10.5 mg/dL Final    BUN/Creatinine Ratio 10/24/2024 26.9 (H)  7.0 - 25.0 Final    Anion Gap 10/24/2024 10.1  5.0 - 15.0 mmol/L Final    eGFR 10/24/2024 64.2  >60.0 mL/min/1.73 Final    WBC 10/24/2024 13.74 (H)  3.40 - 10.80 10*3/mm3 Final    RBC 10/24/2024 3.74 (L)  3.77 - 5.28 10*6/mm3 Final    Hemoglobin 10/24/2024 11.0 (L)  12.0 - 15.9 g/dL Final    Hematocrit 10/24/2024 34.7  34.0 - 46.6 % Final    MCV 10/24/2024 92.8  79.0 - 97.0 fL Final    MCH 10/24/2024 29.4  26.6 - 33.0 pg Final    MCHC 10/24/2024 31.7  31.5 - 35.7 g/dL Final    RDW 10/24/2024 13.2  12.3 - 15.4 % Final    RDW-SD 10/24/2024 44.5  37.0 - 54.0 fl Final    MPV 10/24/2024 10.2  6.0 - 12.0 fL Final    Platelets 10/24/2024 193  140 - 450 10*3/mm3 Final    Neutrophil % 10/24/2024 79.9 (H)  42.7 - 76.0 % Final    Lymphocyte % 10/24/2024 12.9 (L)  19.6 - 45.3 % Final    Monocyte % 10/24/2024 6.7  5.0 - 12.0 % Final    Eosinophil % 10/24/2024 0.0 (L)  0.3 - 6.2 % Final    Basophil % 10/24/2024 0.1  0.0 - 1.5 % Final    Immature Grans % 10/24/2024 0.4  0.0 - 0.5 % Final    Neutrophils, Absolute 10/24/2024 10.98 (H)  1.70 - 7.00 10*3/mm3 Final    Lymphocytes, Absolute 10/24/2024 1.77  0.70 - 3.10 10*3/mm3 Final    Monocytes, Absolute 10/24/2024 0.92 (H)  0.10 - 0.90 10*3/mm3 Final    Eosinophils, Absolute 10/24/2024 0.00  0.00 - 0.40 10*3/mm3 Final     Basophils, Absolute 10/24/2024 0.01  0.00 - 0.20 10*3/mm3 Final    Immature Grans, Absolute 10/24/2024 0.06 (H)  0.00 - 0.05 10*3/mm3 Final    nRBC 10/24/2024 0.0  0.0 - 0.2 /100 WBC Final       No results found.    Discharge and Follow up Instructions:   Follow-up with me in the office in 2 weeks    Total Knee Joint Replacement Discharge Instructions:    I. ACTIVITIES:  1. Exercises:  Complete exercise program as taught by the hospital physical therapist 2 times per day  Exercise program will be advanced by the physical therapist  During the day be up ambulating every 2 hours (while awake) for short distances  Complete the ankle pump exercises at least 10 times per hour (while awake)  Elevate legs most of the day the first week post operatively and thereafter elevate legs when in bed and for at least 30 minutes during the day. Caution must be taken to avoid pillow placement under the bend of the knee as this can led to flexion contractures of the knee.  Use cold packs 20-30 minutes approximately 5 times per day. This should be done before and after completing your exercises and at any time you are experiencing pain/ stiffness in your operative extremity.      2. Activities of Daily Living:  No tub baths, hot tubs, or swimming pools for 4 weeks  May shower and let water run over the incision on post-operative day #5 if no drainage. Do not scrub or rub the incision. Simply let the water run over the incision and pat dry.    II. Restrictions  Do not cross legs or kneel  Your surgeon will discuss with you when you will be able to drive again.  Weight bearing is as tolerated  First week stay inside on even terrain. May go up and down stairs one stair at a time utilizing the hand rail  After one week, you may venture outside.    III. Precautions:  Everyone that comes near you should wash their hands  No elective dental, genital-urinary, or colon procedures or surgical procedures for 12 weeks after surgery unless  absolutely necessary.   If dental work or surgical procedure is deemed absolutely necessary, you will need to contact your surgeon as you will need to take antibiotics 1 hour prior to any dental work (including teeth cleanings).  Please discuss with your surgeon prophylactic antibiotics as the length of time this intervention will be necessary for you varies with each patient’s health history and situation.  Avoid sick people. If you must be around someone who is ill, they should wear a mask.  Avoid visits to the Emergency Room or Urgent Care unless you are having a life threatening event.   If ordered stockings are to be placed on in the morning and removed at night. Monitor the stockings to ensure that any swelling is not causing the stockings to become too tight. In this case, remove stockings immediately.    IV. INCISION CARE:  May remove the Ace wrap 2 days following surgery  The negative pressure dressing with the battery pack is waterproof and should remain in place for 7 days.  You may shower with the negative pressure dressing as it is waterproof  Following removal of the dressing on day 7 you may shower and allow water to run over the incision  No submersion of incision in water such as tubs pools hot tubs etc.  No creams or ointments to the incision  Do not touch or pick at the incision  Check incision/dressing every day and notify surgeon immediately if any of the following signs or symptoms are noted:  Increase in redness  Increase in swelling around the incision and of the entire extremity  Increase in pain  Drainage oozing from the incision  Pulling apart of the edges of the incision  Increase in overall body temperature (greater than 100.5 degrees)  Your surgeon will instruct you regarding suture or staple removal    V. Medications:   1. Anticoagulants: You will be discharged on an anticoagulant. This is a prophylactic medication that helps prevent blood clots during your post-operative period. The  type and length of dosage varies based on your individual needs, procedure performed, and surgeon’s preference.  While taking the anticoagulant, you should avoid taking any additional aspirin, ibuprofen (Advil or Motrin), Aleve (Naprosyn) or other non-steroidal anti-inflammatory medications.   Notify surgeon immediately if any karolyn bleeding is noted in the urine, stool, emesis, or from the nose or the incision. Blood in the stool will often appear as black rather than red. Blood in urine may appear as pink. Blood in emesis may appear as brown/black like coffee grounds.  You will need to apply pressure for longer periods of time to any cuts or abrasions to stop bleeding  Avoid alcohol while taking anticoagulants    2. Stool Softeners: You will be at greater risk of constipation after surgery due to being less mobile and the pain medications.   Take stool softeners as instructed by your surgeon while on pain medications. Over the counter Colace 100 mg 1-2 capsules twice daily.   If stools become too loose or too frequent, please decreases the dosage or stop the stool softener.  If constipation occurs despite use of stool softeners, you are to continue the stool softeners and add a laxative (Milk of Magnesia 1 ounce daily as needed)  Drink plenty of fluids, and eat fruits and vegetables during your recovery time    3. Pain Medications utilized after surgery are narcotics and the law requires that the following information be given to all patients that are prescribed narcotics:  CLASSIFICATION: Pain medications are called Opioids and are narcotics  LEGALITIES: It is illegal to share narcotics with others and to drive within 24 hours of taking narcotics  POTENTIAL SIDE EFFECTS: Potential side effects of opioids include: nausea, vomiting, itching, dizziness, drowsiness, dry mouth, constipation, and difficulty urinating.  POTENTIAL ADVERSE EFFECTS:   Opioid tolerance can develop with use of pain medications and this  simply means that it requires more and more of the medication to control pain; however, this is seen more in patients that use opioids for longer periods of time.  Opioid dependence can develop with use of Opioids and this simply means that to stop the medication can cause withdrawal symptoms; however, this is seen with patients that use Opioids for longer periods of time.  Opioid addiction can develop with use of Opioids and the incidence of this is very unlikely in patients who take the medications as ordered and stop the medications as instructed.  Opioid overdose can be dangerous, but is unlikely when the medication is taken as ordered and stopped when ordered. It is important not to mix opioids with alcohol or with and type of sedative such as Benadryl as this can lead to over sedation and respiratory difficulty.  DOSAGE:   Pain medications will need to be taken consistently for the first week to decrease pain and promote adequate pain relief and participation in physical therapy.  After the initial surgical pain begins to resolve, you may begin to decrease the pain medication. By the end of 6 weeks, you should be off of pain medications.  Refills will not be given by the office during evening hours, on weekends, or after 6 weeks post-op.  To seek refills on pain medications during the initial 6 week post-operative period, you must call the office 48 hours in advance to request the refill. The office will then notify you when to  the prescription. DO NOT wait until you are out of the medication to request a refill.    V. FOLLOW-UP VISITS:  You will need to follow up in the office with your surgeon in 2 weeks. Please call this number [phone] to schedule this appointment.  If you have any concerns or suspected complications prior to your follow up visit, please call your surgeons office. Do not wait until your appointment time if you suspect complications. These will need to be addressed in the office  promptly.        Date: 10/24/2024    Rowdy Gracia MD

## 2024-10-24 NOTE — CONSULTS
[facility] HOSPITALIST CONSULT NOTE    Consulting Physician:  Rowdy Bella MD    CONSULT REASON: Medical management    HISTORY OF PRESENT ILLNESS:    74 y/o female with hx of HTN, EDUARDO on CPAP, hyperlipidemia, COPD, and CAD was admitted to [facility] following left TKA.  She tolerated the procedure well.  Currently, she says her pain is fairly well controlled.  She denies SOB, chest or abdominal pain, nausea, vomiting, or headache.  She wears a CPAP and has it with her.    Past Medical History:   Diagnosis Date    Allergic     Anxiety     Bursitis of hip hurts when walking and sleeping on side.    COPD (chronic obstructive pulmonary disease)     Coronary artery disease     stent placed    CTS (carpal tunnel syndrome) yes,  ?    Depression     Elevated cholesterol     Frozen shoulder     Hip arthrosis 1122/    pain when walking    Hyperlipidemia     Hypertension     Knee sprain twisted it Sore    Knee swelling     Obesity     Periarthritis of shoulder grinds    Sleep apnea     CPAP    Tear of meniscus of knee left knee, 5yrs. ago now right knee I think.     Past Surgical History:   Procedure Laterality Date    CHOLECYSTECTOMY      COLONOSCOPY      CORONARY STENT PLACEMENT      HAND SURGERY  both thumb joints from repitation movements    SHOULDER SURGERY Left     TONSILLECTOMY      TRIGGER POINT INJECTION  knleft knee    TUBAL ABDOMINAL LIGATION       Family History   Problem Relation Age of Onset    Diabetes Mother     Breast cancer Father     Cancer Sister     Breast cancer Maternal Aunt      Social History     Tobacco Use    Smoking status: Former     Current packs/day: 0.00     Average packs/day: 2.0 packs/day for 32.9 years (65.9 ttl pk-yrs)     Types: Cigarettes     Start date: 1966     Quit date: 11/10/1999     Years since quittin.9     Passive exposure: Past    Smokeless tobacco: Never   Vaping Use    Vaping status: Never Used   Substance Use Topics    Alcohol use:  Never    Drug use: Never     Medications Prior to Admission   Medication Sig Dispense Refill Last Dose/Taking    CINNAMON PO Take  by mouth.   Past Month    escitalopram (LEXAPRO) 10 MG tablet TAKE 1 TABLET BY MOUTH DAILY 90 tablet 3 10/22/2024    Flaxseed, Linseed, (Flax Seed Oil) 1000 MG capsule Take 1,000 mg by mouth.   Past Month    aspirin 81 MG EC tablet Take 1 tablet by mouth Daily. (Patient not taking: Reported on 10/22/2024) 90 tablet 3 10/16/2024    atorvastatin (LIPITOR) 40 MG tablet Take 1 tablet by mouth Every Night. 90 tablet 3 10/16/2024    cholecalciferol (VITAMIN D3) 10 MCG (400 UNIT) tablet Take 1 tablet by mouth. (Patient not taking: Reported on 10/22/2024)   10/16/2024    clopidogrel (PLAVIX) 75 MG tablet Take 1 tablet by mouth Daily. (Patient not taking: Reported on 10/22/2024)   10/16/2024    clotrimazole-betamethasone (LOTRISONE) 1-0.05 % cream 1 cream two times daily   More than a month    ezetimibe (ZETIA) 10 MG tablet Take 1 tablet by mouth Daily. 90 tablet 3 10/16/2024    Fluticasone-Umeclidin-Vilant (Trelegy Ellipta) 100-62.5-25 MCG/ACT inhaler Inhale 1 puff.   More than a month    Magnesium 250 MG tablet Take  by mouth. (Patient not taking: Reported on 10/22/2024)   10/16/2024    metoprolol succinate XL (TOPROL-XL) 50 MG 24 hr tablet Take 1 tablet by mouth Daily. 90 tablet 3 10/16/2024    nitroglycerin (NITROSTAT) 0.4 MG SL tablet Place 1 tablet under the tongue Every 5 (Five) Minutes As Needed for Chest Pain. Take no more than 3 doses in 15 minutes. (Patient not taking: Reported on 10/22/2024) 30 tablet 11     Rybelsus 14 MG tablet TAKE 1 TABLET BY MOUTH DAILY 90 tablet 3 10/16/2024    vitamin C (ASCORBIC ACID) 250 MG tablet Take 2 tablets by mouth Daily. (Patient not taking: Reported on 10/22/2024)   10/16/2024     Allergies:  Patient has no known allergies.    Immunization History   Administered Date(s) Administered    Arexvy (RSV, Adults 60+ yrs) 12/15/2023    COVID-19 (MODERNA)  "1st,2nd,3rd Dose Monovalent 02/13/2021, 03/15/2021, 12/18/2021    COVID-19 (MODERNA) BIVALENT 12+YRS 11/11/2022    COVID-19 (MODERNA) Monovalent Original Booster 11/11/2022    FLUAD TRI 65YR+ 10/25/2018, 10/24/2020    FluMist 2-49yrs 12/17/2014    Fluad Quad 65+ 11/11/2022    Fluzone  >6mos 10/24/2020    Fluzone High-Dose 65+YRS 10/28/2015, 11/05/2019    Fluzone High-Dose 65+yrs 10/28/2015, 10/12/2021, 10/10/2023    Influenza Seasonal Injectable 11/08/2002    Influenza recombinant 10/25/2018    Pneumococcal Conjugate 13-Valent (PCV13) 10/28/2015    Pneumococcal Conjugate 20-Valent (PCV20) 10/10/2023    Pneumococcal Polysaccharide (PPSV23) 10/28/2015, 04/02/2018    Shingrix 10/14/2021, 02/08/2022    Zostavax 10/28/2015       REVIEW OF SYSTEMS:  Please see the above history of present illness for pertinent positives and negatives.  The remainder of the patient's systems have been reviewed and are negative.    Vital Signs  Temp:  [97.1 °F (36.2 °C)-98.3 °F (36.8 °C)] 97.1 °F (36.2 °C)  Heart Rate:  [63-79] 72  Resp:  [15-20] 18  BP: (107-187)/(58-96) 146/58  Flowsheet Rows      Flowsheet Row First Filed Value   Admission Height 160 cm (62.99\") Documented at 10/23/2024 1458   Admission Weight 87.4 kg (192 lb 9.6 oz) Documented at 10/23/2024 1021               Physical Exam:  General: Patient is awake and alert. Sitting up in bed; on oxygen; NAD  HENT: Head is atraumatic, normocephalic  Eyes: Vision is grossly intact. Pupils appear equal and round.   Cardiovascular: RRR, S1-S2  Respiratory: Lungs are diminished at bases; no wheezing or rales   Abdominal/GI: Soft, nontender, bowel sounds present. No rebound or guarding present.   Extremities: No peripheral edema noted.   Musculoskeletal: left knee bandaged; in cooling wrap   Skin: Warm and dry.   Psych: Mood and affect are appropriate. Cooperative with exam.   Neuro: No facial asymmetry noted. No focal deficits noted, hearing and vision are grossly intact.       Results " Review:    I reviewed the patient's new clinical results.  Lab Results (most recent)       None            Imaging Results (Most Recent)       Procedure Component Value Units Date/Time    XR Knee 1 or 2 View Left [[id number]] Collected: 10/23/24 1417     Updated: 10/23/24 1421    Narrative:      XR KNEE 1 OR 2 VW LEFT    Date of Exam: 10/23/2024 2:08 PM EDT    Indication: postop total knee  Post-op Knee Arthoplasty    Comparison: None available.    FINDINGS:  Postoperative changes are noted status post total knee arthroplasty. Near anatomic alignment is observed. There is no evidence for periimplant fracture. Surgical changes are seen within the surrounding soft tissues.      Impression:      Postoperative changes status post total knee arthroplasty. Near-anatomic alignment is noted. There is no evidence for periimplant fracture.      Electronically Signed: Clemente Saunders MD    10/23/2024 2:18 PM EDT    Workstation ID: [id number]    US ShareTrackerosite Portable [[id number]] Resulted: 10/23/24 1000     Updated: 10/23/24 1000    Narrative:      This procedure was auto-finalized with no dictation required.          reviewed    ECG/EMG Results (most recent)       None          reviewed    Assessment & Plan     HTN  BP up a bit more tonight  Continue home BP meds  Prn IV hydralazine for systolic >170    COPD  No active wheezing  Continue home inhalers       Hypoxia  Mild; likely post-op atelectasis  IS to bedside  Wean oxygen as tolerated    EDUARDO  Continue home CPAP qhs    CAD  Continue metoprolol; hold Plavix but resume when ok with Ortho    OA knee s/p left TKA  Continue routine post-op care, pain control, and DVT ppx per primary team    Thanks for the consult; we will continue to follow along during patient's hospital stay.                  Bonifacio Carlson DO  07/09/2024   21:17 EDT     At [facility], we believe that sharing information builds trust and better relationships. You are receiving this note because you recently  "visited [facility]. It is possible you will see health information before a provider has talked with you about it. This kind of information can be easy to misunderstand. To help you fully understand what it means for your health, we urge you to discuss this note with your provider.     \"Dictated utilizing Dragon dictation\"    "

## 2024-10-24 NOTE — PLAN OF CARE
Goal Outcome Evaluation:  Plan of Care Reviewed With: patient           Outcome Evaluation: OT evaluation completed. Patient performed bed mobility with modified independence and donned socks independently. Patient stood with supervison and performed functional mobility with FWW and SBA >100 ft. Patient reports she has no concerns regarding return home with spouse and daughter. Patient has no skilled OT needs at this time.    Anticipated Discharge Disposition (OT): home with home health (reports home health scheduled to come tomorrow)

## 2024-10-24 NOTE — CASE MANAGEMENT/SOCIAL WORK
Continued Stay Note  HARRIS Machuca     Patient Name: Edilma Doss  MRN: [id number]  Today's Date: 10/24/2024    Admit Date: 10/23/2024    Plan[facility]   Discharge Plan       Row Name 10/24/24 0940       Plan    Plan Plan home/[facility]     Patient/Family in Agreement with Plan yes    Plan Comments Follow up visit. Patient verifies she has all needed DME, her  will be able to assist her as needed. [facility]  will follow for home PT. Anticipate dc home this morning.                   Discharge Codes    No documentation.                 Expected Discharge Date and Time       Expected Discharge Date Expected Discharge Time    Oct 24, 2024               Suresh Ferrer RN

## 2024-10-24 NOTE — DISCHARGE INSTR - APPOINTMENTS
Patient has follow up appointment with Dr. Gracia on 11/8/2024 at 1:00                                [phone]    Patient has follow up appointment with Dr. Mosley on 11/8/2024 at 11:15                              [phone]

## 2024-10-24 NOTE — PLAN OF CARE
Goal Outcome Evaluation:  Plan of Care Reviewed With: patient        Progress: improving  Outcome Evaluation: Pt VS improving. POD#1 L knee, surgical dsg c/d/i, CHRISTOPHER, ice wrap. Pt denies pain overnight. Pt denies n/v/d, reports tolerating diet. Pt up with assist x1, walker, ambulating well in hallway. Pt reports desire to d/c home soon. Pt resting at this time.

## 2024-10-24 NOTE — OUTREACH NOTE
Prep Survey      Flowsheet Row Responses   Congregation facility patient discharged from? [place]   Is LACE score < 7 ? Yes   Eligibility [facility]   Date of Admission 10/23/24   Date of Discharge 10/24/24   Discharge Disposition Home or Self Care   Discharge diagnosis TOTAL KNEE ARTHROPLASTY   Does the patient have one of the following disease processes/diagnoses(primary or secondary)? Total Joint Replacement   Does the patient have Home health ordered? Yes   What is the Home health agency?  [facility]   Is there a DME ordered? No   Prep survey completed? Yes            SHERRY MELTON - Registered Nurse

## 2024-10-24 NOTE — THERAPY DISCHARGE NOTE
Patient Name: Edilma Doss  : 1950    MRN: [id number]                              Today's Date: 10/24/2024       Admit Date: 10/23/2024    Visit Dx:     ICD-10-CM ICD-9-CM   1. S/P total knee arthroplasty, left  Z96.652 V43.65   2. Primary osteoarthritis of left knee  M17.12 715.16     Patient Active Problem List   Diagnosis    CAD in native artery    COPD (chronic obstructive pulmonary disease)    Depression    Dyslipidemia, goal LDL below 70    History of tobacco abuse    HTN (hypertension), benign    Morbid obesity with BMI of 40.0-44.9, adult    Obstructive sleep apnea on CPAP    PAD (peripheral artery disease)    Prediabetes    OA (osteoarthritis) of knee    S/P total knee arthroplasty, left     Past Medical History:   Diagnosis Date    Allergic     Anxiety     Bursitis of hip hurts when walking and sleeping on side.    COPD (chronic obstructive pulmonary disease)     Coronary artery disease     stent placed    CTS (carpal tunnel syndrome) yes,  ?    Depression     Elevated cholesterol     Frozen shoulder     Hip arthrosis 1122/    pain when walking    Hyperlipidemia     Hypertension     Knee sprain twisted it Sore    Knee swelling     Obesity     Periarthritis of shoulder grinds    Sleep apnea     CPAP    Tear of meniscus of knee left knee, 5yrs. ago now right knee I think.     Past Surgical History:   Procedure Laterality Date    CHOLECYSTECTOMY      COLONOSCOPY      CORONARY STENT PLACEMENT      HAND SURGERY  both thumb joints from repitation movements    SHOULDER SURGERY Left     TONSILLECTOMY      TRIGGER POINT INJECTION  knleft knee    TUBAL ABDOMINAL LIGATION        General Information       Row Name 10/24/24 0909          Physical Therapy Time and Intention    Document Type discharge evaluation/summary  -JW     Mode of Treatment physical therapy  -       Row Name 10/24/24 0909          General Information    Patient Profile Reviewed yes  pt s/p left TKA, WBAT  -JW     Prior Level of  Function independent:;all household mobility;community mobility;ADL's  -     Existing Precautions/Restrictions no known precautions/restrictions  -     Barriers to Rehab none identified  -       Row Name 10/24/24 0909          Living Environment    People in Home spouse  -       Row Name 10/24/24 0909          Home Main Entrance    Number of Stairs, Main Entrance three  -JW     Stair Railings, Main Entrance railings on both sides of stairs  -       Row Name 10/24/24 0909          Stairs Within Home, Primary    Number of Stairs, Within Home, Primary none  -       Row Name 10/24/24 0909          Cognition    Orientation Status (Cognition) oriented x 3  -JW               User Key  (r) = Recorded By, (t) = Taken By, (c) = Cosigned By      Initials Name Provider Type    Deidre Centeno PT Physical Therapist                   Mobility       Row Name 10/24/24 0909          Bed Mobility    Bed Mobility supine-sit  -     Supine-Sit [place] (Bed Mobility) modified independence  -     Assistive Device (Bed Mobility) head of bed elevated  -       Row Name 10/24/24 0909          Sit-Stand Transfer    Sit-Stand [place] (Transfers) supervision  -     Assistive Device (Sit-Stand Transfers) walker, front-wheeled  -       Row Name 10/24/24 0909          Gait/Stairs (Locomotion)    [place] Level (Gait) supervision  -     Assistive Device (Gait) walker, front-wheeled  -     Distance in Feet (Gait) 150  -JW     Deviations/Abnormal Patterns (Gait) lelo decreased  -     Bilateral Gait Deviations forward flexed posture  -     [place] Level (Stairs) contact guard  -     Handrail Location (Stairs) both sides  -     Number of Steps (Stairs) 3  -     Ascending Technique (Stairs) step-to-step  -     Descending Technique (Stairs) step-to-step  -       Row Name 10/24/24 0909          Mobility    Extremity Weight-bearing Status left lower extremity  -     Left Lower Extremity  "(Weight-bearing Status) weight-bearing as tolerated (WBAT)  -               User Key  (r) = Recorded By, (t) = Taken By, (c) = Cosigned By      Initials Name Provider Type    Deidre Centeno, AGUEDA Physical Therapist                   Obj/Interventions       Row Name 10/24/24 0909          Range of Motion Comprehensive    Comment, General Range of Motion right LE WFL, left LE not formally tested  -[facility] Name 10/24/24 09          Strength Comprehensive (MMT)    Comment, General Manual Muscle Testing (MMT) Assessment right LE WFL, left LE not formally tested  -       Row Name 10/24/24 0909          Motor Skills    Therapeutic Exercise --  pt performs 10 reps of LE HEP, written handout provided  -[facility] Name 10/24/24 0909          Balance    Comment, Balance SBA for standing balance with walker  -               User Key  (r) = Recorded By, (t) = Taken By, (c) = Cosigned By      Initials Name Provider Type    Deidre Centeno, AGUEDA Physical Therapist                   Goals/Plan    No documentation.                  Clinical Impression       [facility] Name 10/24/24 0909          Pain    Pretreatment Pain Rating 0/10 - no pain  -     Posttreatment Pain Rating 0/10 - no pain  -[facility] Name 10/24/24 0909          Plan of Care Review    Plan of Care Reviewed With patient  -     Outcome Evaluation PT Evaluation complete.  Patient performs bed mobility with modified independence and sit to/from stand with SBA.  Patient performs gait with rolling walker x150 feet, SBA and ascends/descends 3 steps with 2 handrails, CGA.  Patient performs 10 reps of LE HEP, written handout provided.  Patient reports no concerns regarding return home at this time, no further PT needs in acute care setting.  Patient prefers home health PT at discharge.  -       Row Name 10/24/24 0909          Therapy Assessment/Plan (PT)    Patient/Family Therapy Goals Statement (PT) \"go home\"  -     Criteria for Skilled Interventions " Met (PT) no problems identified which require skilled intervention  -JW     Therapy Frequency (PT) evaluation only  -JW       Row Name 10/24/24 0909          Positioning and Restraints    Pre-Treatment Position in bed  -JW     Post Treatment Position chair  -JW     In Chair reclined;call light within reach;encouraged to call for assist;notified nsg  -JW               User Key  (r) = Recorded By, (t) = Taken By, (c) = Cosigned By      Initials Name Provider Type    Deidre Centeno, PT Physical Therapist                   Outcome Measures       Row Name 10/24/24 0909          How much help from another person do you currently need...    Turning from your back to your side while in flat bed without using bedrails? 4  -JW     Moving from lying on back to sitting on the side of a flat bed without bedrails? 3  -JW     Moving to and from a bed to a chair (including a wheelchair)? 3  -JW     Standing up from a chair using your arms (e.g., wheelchair, bedside chair)? 3  -JW     Climbing 3-5 steps with a railing? 3  -JW     To walk in hospital room? 3  -JW     AM-PAC 6 Clicks Score (PT) 19  -JW     Highest Level of Mobility Goal 6 --> Walk 10 steps or more  -JW       Row Name 10/24/24 0909          PADD    Diagnosis 1  -JW     Gender 1  -JW     Age Group 1  -JW     Gait Distance 1  -JW     Assist Level 2  -JW     Home Support 3  -JW     PADD Score 9  -JW     Patient Preference home with home health  -JW     Prediction by PADD Score directly home (with home health or out-patient rehab)  -       Row Name 10/24/24 0931 10/24/24 0909       Functional Assessment    Outcome Measure Options AM-PAC 6 Clicks Daily Activity (OT)  -EN AM-PAC 6 Clicks Basic Mobility (PT);PADD  -JW              User Key  (r) = Recorded By, (t) = Taken By, (c) = Cosigned By      Initials Name Provider Type    Cheyenne Sánchez OTR Occupational Therapist    Deidre Centeno, PT Physical Therapist                  Physical Therapy Education        Title: PT OT SLP Therapies (Resolved)       Topic: Physical Therapy (Resolved)       Point: Mobility training (Resolved)       Learning Progress Summary            Patient Acceptance, E,TB, VU by TIM at 10/24/2024 1227                      Point: Home exercise program (Resolved)       Learning Progress Summary            Patient Acceptance, E,TB, VU by  at 10/24/2024 1227                                      User Key       Initials Effective Dates Name Provider Type [facility] 06/16/21 -  Deidre Redding, PT Physical Therapist PT                  PT Recommendation and Plan     Outcome Evaluation: PT Evaluation complete.  Patient performs bed mobility with modified independence and sit to/from stand with SBA.  Patient performs gait with rolling walker x150 feet, SBA and ascends/descends 3 steps with 2 handrails, CGA.  Patient performs 10 reps of LE HEP, written handout provided.  Patient reports no concerns regarding return home at this time, no further PT needs in acute care setting.  Patient prefers home health PT at discharge.     Time Calculation:   PT Evaluation Complexity  History, PT Evaluation Complexity: 1-2 personal factors and/or comorbidities  Examination of Body Systems (PT Eval Complexity): 1-2 elements  Clinical Presentation (PT Evaluation Complexity): stable  Clinical Decision Making (PT Evaluation Complexity): low complexity  Overall Complexity (PT Evaluation Complexity): low complexity     PT Charges       Row Name 10/24/24 1228             Time Calculation    Start Time 0909  -      Stop Time 0935  -      Time Calculation (min) 26 min  -      PT Received On 10/24/24  -                User Key  (r) = Recorded By, (t) = Taken By, (c) = Cosigned By      Initials Name Provider Type     Deidre Redding, PT Physical Therapist                  Therapy Charges for Today       Code Description Service Date Service Provider Modifiers Qty    48132419719 HC PT EVAL LOW COMPLEXITY 2  10/24/2024 Deidre Redding, PT GP 1            PT G-Codes  Outcome Measure Options: AM-PAC 6 Clicks Daily Activity (OT)  AM-PAC 6 Clicks Score (PT): 19  AM-PAC 6 Clicks Score (OT): 24    PT Discharge Summary  Anticipated Discharge Disposition (PT): home with home health (per pt preference)    Deidre Redding, PT  10/24/2024

## 2024-10-24 NOTE — THERAPY DISCHARGE NOTE
Acute Care - Occupational Therapy Discharge   [place]    Patient Name: Edilma Doss  : 1950    MRN: [id number]                              Today's Date: 10/24/2024       Admit Date: 10/23/2024    Visit Dx:     ICD-10-CM ICD-9-CM   1. S/P total knee arthroplasty, left  Z96.652 V43.65   2. Primary osteoarthritis of left knee  M17.12 715.16     Patient Active Problem List   Diagnosis    CAD in native artery    COPD (chronic obstructive pulmonary disease)    Depression    Dyslipidemia, goal LDL below 70    History of tobacco abuse    HTN (hypertension), benign    Morbid obesity with BMI of 40.0-44.9, adult    Obstructive sleep apnea on CPAP    PAD (peripheral artery disease)    Prediabetes    OA (osteoarthritis) of knee    S/P total knee arthroplasty, left     Past Medical History:   Diagnosis Date    Allergic     Anxiety     Bursitis of hip hurts when walking and sleeping on side.    COPD (chronic obstructive pulmonary disease)     Coronary artery disease     stent placed    CTS (carpal tunnel syndrome) yes,  ?    Depression     Elevated cholesterol     Frozen shoulder     Hip arthrosis 1122/    pain when walking    Hyperlipidemia     Hypertension     Knee sprain twisted it Sore    Knee swelling     Obesity     Periarthritis of shoulder grinds    Sleep apnea     CPAP    Tear of meniscus of knee left knee, 5yrs. ago now right knee I think.     Past Surgical History:   Procedure Laterality Date    CHOLECYSTECTOMY      COLONOSCOPY      CORONARY STENT PLACEMENT      HAND SURGERY  both thumb joints from repitation movements    SHOULDER SURGERY Left     TONSILLECTOMY      TRIGGER POINT INJECTION  knleft knee    TUBAL ABDOMINAL LIGATION        General Information       Row Name 10/24/24 0989          OT Time and Intention    Subjective Information no complaints  -EN     Document Type discharge evaluation/summary  -EN     Mode of Treatment occupational therapy  -EN     Patient Effort good  -EN      Symptoms Noted During/After Treatment none  -EN       Row Name 10/24/24 0925          General Information    Patient Profile Reviewed yes  s/p L TKA  -EN     Prior Level of Function independent:;all household mobility;ADL's  -EN     Existing Precautions/Restrictions no known precautions/restrictions  -EN     Barriers to Rehab none identified  -EN       Row Name 10/24/24 0925          Living Environment    People in Home spouse  -EN       Row Name 10/24/24 0925          Home Main Entrance    Number of Stairs, Main Entrance three  -EN     Stair Railings, Main Entrance railings on both sides of stairs  -EN       Row Name 10/24/24 0925          Stairs Within Home, Primary    Number of Stairs, Within Home, Primary none  -EN       Row Name 10/24/24 0925          Cognition    Orientation Status (Cognition) oriented x 3  -EN               User Key  (r) = Recorded By, (t) = Taken By, (c) = Cosigned By      Initials Name Provider Type    Cheyenne Sánchez OTR Occupational Therapist                   Mobility/ADL's       Row Name 10/24/24 0926          Bed Mobility    Bed Mobility supine-sit  -EN     Supine-Sit [place] (Bed Mobility) modified independence  -EN     Assistive Device (Bed Mobility) head of bed elevated  -EN       Row Name 10/24/24 0926          Transfers    Transfers sit-stand transfer  -EN       Row Name 10/24/24 0926          Sit-Stand Transfer    Sit-Stand [place] (Transfers) supervision  -EN     Assistive Device (Sit-Stand Transfers) walker, front-wheeled  -EN       Row Name 10/24/24 0926          Functional Mobility    Functional Mobility- Ind. Level standby assist  -EN     Functional Mobility- Device walker, front-wheeled  -EN     Functional Mobility-Distance (Feet) --  >100  -EN       Row Name 10/24/24 0926          Activities of Daily Living    BADL Assessment/Intervention lower body dressing  -EN       Row Name 10/24/24 0926          Mobility    Extremity Weight-bearing Status left lower  extremity  -EN     Left Lower Extremity (Weight-bearing Status) weight-bearing as tolerated (WBAT)  -EN       Row Name 10/24/24 0926          Lower Body Dressing Assessment/Training    [place] Level (Lower Body Dressing) independent;socks  -EN               User Key  (r) = Recorded By, (t) = Taken By, (c) = Cosigned By      Initials Name Provider Type    Cheyenne Sánchez OTR Occupational Therapist                   Obj/Interventions       Row Name 10/24/24 0928          Range of Motion Comprehensive    General Range of Motion bilateral upper extremity ROM WFL  -EN       Row Name 10/24/24 0928          Strength Comprehensive (MMT)    Comment, General Manual Muscle Testing (MMT) Assessment B UE strength WFL  -EN       Row Name 10/24/24 0928          Balance    Comment, Balance independent with dyanamic sitting balance, supervision for standing balance with FWW  -EN               User Key  (r) = Recorded By, (t) = Taken By, (c) = Cosigned By      Initials Name Provider Type    Cheyenne Sánchez, OTR Occupational Therapist                   Goals/Plan    No documentation.                  Clinical Impression       Row Name 10/24/24 0928          Pain Assessment    Pretreatment Pain Rating 0/10 - no pain  -EN     Posttreatment Pain Rating 0/10 - no pain  -EN     Pain Location knee  -EN     Pain Side/Orientation left  -EN       Row Name 10/24/24 0928          Plan of Care Review    Plan of Care Reviewed With patient  -EN     Outcome Evaluation OT evaluation completed. Patient performed bed mobility with modified independence and donned socks independently. Patient stood with supervison and performed functional mobility with FWW and SBA >100 ft. Patient reports she has no concerns regarding return home with spouse and daughter. Patient has no skilled OT needs at this time.  -EN       Row Name 10/24/24 0928          Therapy Assessment/Plan (OT)    Therapy Frequency (OT) evaluation only  -EN       Row Name  10/24/24 0928          Therapy Plan Review/Discharge Plan (OT)    Equipment Needs Upon Discharge (OT) --  has all needed DME  -EN     Anticipated Discharge Disposition (OT) home with home health  reports home health scheduled to come tomorrow  -EN       Row Name 10/24/24 0928          Positioning and Restraints    Pre-Treatment Position in bed  -EN     Post Treatment Position chair  -EN     In Chair with PT  -EN               User Key  (r) = Recorded By, (t) = Taken By, (c) = Cosigned By      Initials Name Provider Type    Cheyenne Sánchez OTR Occupational Therapist                   Outcome Measures       Row Name 10/24/24 0931          How much help from another is currently needed...    Putting on and taking off regular lower body clothing? 4  -EN     Bathing (including washing, rinsing, and drying) 4  -EN     Toileting (which includes using toilet bed pan or urinal) 4  -EN     Putting on and taking off regular upper body clothing 4  -EN     Taking care of personal grooming (such as brushing teeth) 4  -EN     Eating meals 4  -EN     AM-PAC 6 Clicks Score (OT) 24  -EN       Row Name 10/24/24 0931          Functional Assessment    Outcome Measure Options AM-PAC 6 Clicks Daily Activity (OT)  -EN               User Key  (r) = Recorded By, (t) = Taken By, (c) = Cosigned By      Initials Name Provider Type    Cheyenne Sánchez OTR Occupational Therapist                  Occupational Therapy Education        No education to display                  OT Recommendation and Plan  Therapy Frequency (OT): evaluation only  Plan of Care Review  Plan of Care Reviewed With: patient  Outcome Evaluation: OT evaluation completed. Patient performed bed mobility with modified independence and donned socks independently. Patient stood with supervison and performed functional mobility with FWW and SBA >100 ft. Patient reports she has no concerns regarding return home with spouse and daughter. Patient has no skilled OT  needs at this time.  Plan of Care Reviewed With: patient       Time Calculation:   Evaluation Complexity (OT)  Review Occupational Profile/Medical/Therapy History Complexity: brief/low complexity  Assessment, Occupational Performance/Identification of Deficit Complexity: 1-3 performance deficits  Clinical Decision Making Complexity (OT): problem focused assessment/low complexity  Overall Complexity of Evaluation (OT): low complexity     Time Calculation- OT       Row Name 10/24/24 0932             Time Calculation- OT    OT Start Time 0909  -EN      OT Stop Time 0922  -EN      OT Time Calculation (min) 13 min  -EN                User Key  (r) = Recorded By, (t) = Taken By, (c) = Cosigned By      Initials Name Provider Type    EN Cheyenne Diaz OTR Occupational Therapist                  Therapy Charges for Today       Code Description Service Date Service Provider Modifiers Qty    88266978388  OT EVAL LOW COMPLEXITY 1 10/24/2024 Cheyenne Diaz OTR GO 1               OT Discharge Summary  Anticipated Discharge Disposition (OT): home with home health (reports home health scheduled to come tomorrow)    YANETH Christensen  10/24/2024

## 2024-10-24 NOTE — CASE MANAGEMENT/SOCIAL WORK
Case Management Discharge Note      Final Note: dc home         Selected Continued Care - Discharged on 10/24/2024 Admission date: 10/23/2024 - Discharge disposition: Home or Self Care      Destination    No services have been selected for the patient.                Durable Medical Equipment    No services have been selected for the patient.                Dialysis/Infusion    No services have been selected for the patient.                Home Medical Care       Service Provider Services Address Phone Fax Patient Preferred    [facility] [address] [facility]  [facility] [address] [phone] --              Therapy    No services have been selected for the patient.                Community Resources    No services have been selected for the patient.                Community & DME    No services have been selected for the patient.                         Final Discharge Disposition Code: 01 - home or self-care

## 2024-10-24 NOTE — PLAN OF CARE
Goal Outcome Evaluation:  Plan of Care Reviewed With: patient           Outcome Evaluation: PT Evaluation complete.  Patient performs bed mobility with modified independence and sit to/from stand with SBA.  Patient performs gait with rolling walker x150 feet, SBA and ascends/descends 3 steps with 2 handrails, CGA.  Patient performs 10 reps of LE HEP, written handout provided.  Patient reports no concerns regarding return home at this time, no further PT needs in acute care setting.  Patient prefers home health PT at discharge.    Anticipated Discharge Disposition (PT): home with home health (per pt preference)

## 2024-10-24 NOTE — PROGRESS NOTES
[facility]     Progress Note    Patient Name: Edilma Doss  : 1950  MRN: [id number]  Primary Care Physician:  Ferny Mosley DO  Date of admission: 10/23/2024    Subjective   Subjective     Chief Complaint:     History of Present Illness status post left total knee arthroplasty  Patient Reports patient is postop day 1.  She is afebrile and hemodynamically stable.  Hemoglobin is 11.0.  Pain well-controlled oral medication.    Review of Systems    Objective   Objective     Vitals:   Temp:  [97.1 °F (36.2 °C)-98.3 °F (36.8 °C)] 98.1 °F (36.7 °C)  Heart Rate:  [63-79] 67  Resp:  [15-20] 18  BP: (107-187)/(58-96) 135/75  Flow (L/min) (Oxygen Therapy):  [1-3] 1    Physical Exam     Result Review    Result Review:  I have personally reviewed the results from the time of this admission to 10/24/2024 07:20 EDT and agree with these findings:  [x]  Laboratory list / accordion  []  Microbiology  []  Radiology  []  EKG/Telemetry   []  Cardiology/Vascular   []  Pathology  []  Old records  []  Other:  Most notable findings include: Hemoglobin of 11.0.      Assessment & Plan   Assessment / Plan     Brief Patient Summary:  Edilma Doss is a 73 y.o. female who postop day 1 from a left total knee arthroplasty.  She is alert and oriented this morning.  Afebrile.  Hemodynamically stable.  Dressing is dry.  Left leg has good distal pulses no motor or sensory deficit and the calf is nontender.  She has active dorsi and plantarflexion of the foot.    Active Hospital Problems:  Active Hospital Problems    Diagnosis     **OA (osteoarthritis) of knee     S/P total knee arthroplasty, left      Plan:       VTE Prophylaxis:  Mechanical VTE prophylaxis orders are present.        CODE STATUS:       Disposition:  I expect patient to be discharged plan home today after morning physical therapy.  Answered all questions.    Jim Pelayo MD

## 2024-10-25 ENCOUNTER — TELEPHONE (OUTPATIENT)
Dept: ORTHOPEDIC SURGERY | Facility: CLINIC | Age: 74
End: 2024-10-25
Payer: MEDICARE

## 2024-10-25 ENCOUNTER — TRANSITIONAL CARE MANAGEMENT TELEPHONE ENCOUNTER (OUTPATIENT)
Dept: CALL CENTER | Facility: HOSPITAL | Age: 74
End: 2024-10-25
Payer: MEDICARE

## 2024-10-25 NOTE — TELEPHONE ENCOUNTER
Buffy with [facility] called    Asking for v/o. PT 3x/week for 2 weeks. Verbal given.

## 2024-10-25 NOTE — OUTREACH NOTE
Call Center TCM Note      Flowsheet Row Responses   [facility] patient discharged from? [place]   Does the patient have one of the following disease processes/diagnoses(primary or secondary)? Total Joint Replacement   Joint surgery performed? Knee   TCM attempt successful? Yes   Call start time 1543   Call end time 1551   Has the patient been back in either the hospital or Emergency Department since discharge? No   Does the patient have all medications related to this admission filled (includes all antibiotics, pain medications, etc.) Yes   Is the patient taking all medications as directed (includes completed medication regime)? Yes   Is the patient able to teach back alternate methods of pain control? Ice, Knee-elevation/no pillow under knee, Reposition, Correct alignment, Short, frequent activity   Comments Patient has PCP hospital f/u appt already scheduled for 11/08/24 which is past [facility] time frame. Patient unsure if needs to keep this appt-will route to PCP office for review. Surgeon appt 11/08/24 also.   Does the patient have an appointment with their PCP within 7-14 days of discharge? No   Nursing Interventions Routed TCM call to PCP office, Confirmed date/time of appointment   Has home health visited the patient within 72 hours of discharge? Yes   Home health comments HH PT visited today.   DME comments Using a walker.   Psychosocial issues? No   Has the patient began therapy sessions (either in the home or as an out patient)? Yes   If the patient has started attending therapy, what post op day did they begin to attend (either in home or as an out patient)?   Post op day #2.   Does the patient have a wound vac in place? Yes  [CHRISTOPHER dressing.]   Has the patient fallen since discharge? No   Did the patient receive a copy of their discharge instructions? Yes   Nursing interventions Reviewed instructions with patient   What is the patient's perception of their functional status since discharge? Same   Is  the patient able to teach back signs and symptoms of infection? Temp >100.4 for 24h or longer, Incisional drainage, Blisters around incision, Increased swelling or redness around incision (not associated with surgical edema), Severe discomfort or pain, Changes in mobility, Shortness of breath or chest pain   Is the patient able to teach back how to prevent infection? Check incision daily, Wash hands before and after touching incision, Keep incision covered if drainage, Shower only as directed by surgeon, Eat well-balanced diet, No tub baths, hot tub or swimming, No lotion or creams   Is the patient able to teach back signs and symptoms of DVT? Redness in calf, Area hot to touch, Shortness of breath or chest pain, Swelling in calf, Severe pain in calf   Is the patient able to teach back home safety measures? Ability to shower   Did the patient implement home safety suggestions from pre-surgery classes if attended? N/A   If the patient is a current smoker, are they able to teach back resources for cessation? Not a smoker   Is the patient/caregiver able to teach back the hierarchy of who to call/visit for symptoms/problems? PCP, Specialist, Home health nurse, Urgent Care, ED, 911 Yes   TCM call completed? Yes   Wrap up additional comments Patient states is about the same. States CHRISTOPHER dressing dry/intact. Denies any s/s of infection/DVT.  PT visited today. Denies any needs or concerns today. TCM complete.   Call end time 1551   Would this patient benefit from a Referral to [facility] Social Work? No   Is the patient interested in additional calls from an ambulatory ? No            Sarah Beth Haider RN    10/25/2024, 15:53 EDT

## 2024-10-25 NOTE — OUTREACH NOTE
Call Center TCM Note      Flowsheet Row Responses   [facility] facility patient discharged from? [place]   Does the patient have one of the following disease processes/diagnoses(primary or secondary)? Total Joint Replacement   Joint surgery performed? Knee   TCM attempt successful? No  [ PT just arrived-requested call back this afternoon.]   Unsuccessful attempts Attempt 1   Call Status Left message            Sarah Beth Haider RN    10/25/2024, 09:30 EDT

## 2024-10-28 NOTE — PROGRESS NOTES
Ferny Mosley,   [facility]  [facility]  [place] [id number] [phone]    Subjective      Name Edilma Doss MRN [id number]    1950 AGE/SEX 73 y.o. / female      Chief Complaint Chief Complaint   Patient presents with    Hypertension    Hyperlipidemia     3 month check up          Visit History for  10/14/2024    Edilma Doss is a 73 y.o. female who presented today for Hypertension and Hyperlipidemia (3 month check up )       History of Present Illness  The patient presents for a preoperative evaluation.    She reports a weight gain of 2 pounds, attributing it to a recent vacation where she indulged in foods she typically avoids, such as cinnamon rolls, biscuits, gravy, and bagels. She plans to resume her smoothie diet, which includes protein powder, eber seeds, and flaxseed, as these ingredients usually satiate her hunger throughout the day.    She recently had lab tests. There are no issues with her current medications, and she does not require any refills. Her Plavix will be discontinued on 10/16/2024, and she has been advised against taking any herbal supplements. Her Rybelsus medication will also be stopped.    She expresses concern about potential mobility issues post-surgery and acknowledges the importance of exercise. She is curious about the possibility of receiving an injection in her other knee, as she has previously received one in the knee scheduled for surgery. Additionally, she is worried about the risk of blood clots and wonders how frequent short walks, as recommended in her paperwork, might affect her sleep.       Medications and Allergies   Current Outpatient Medications   Medication Instructions    aspirin 81 mg, Oral, Daily    atorvastatin (LIPITOR) 40 mg, Oral, Nightly    cholecalciferol (VITAMIN D3) 400 Units    CINNAMON PO Take  by mouth.    clopidogrel (PLAVIX) 75 mg, Daily    clotrimazole-betamethasone (LOTRISONE) 1-0.05 %  "cream 1 cream two times daily    escitalopram (LEXAPRO) 10 mg, Oral, Daily    ezetimibe (ZETIA) 10 mg, Oral, Daily    Flax Seed Oil 1,000 mg    Fluticasone-Umeclidin-Vilant (Trelegy Ellipta) 100-62.5-25 MCG/ACT inhaler 1 puff    Magnesium 250 MG tablet Take  by mouth.    metoprolol succinate XL (TOPROL-XL) 50 mg, Oral, Daily    nitroglycerin (NITROSTAT) 0.4 mg, Sublingual, Every 5 Minutes PRN, Take no more than 3 doses in 15 minutes.    ondansetron (ZOFRAN) 4 mg, Oral, Every 8 Hours PRN    oxyCODONE-acetaminophen (PERCOCET) 5-325 MG per tablet 1 tablet, Oral, Every 4 Hours PRN    Rybelsus 14 mg, Oral, Daily    sennosides-docusate (senna-docusate sodium) 8.6-50 MG per tablet 1 tablet, Oral, Daily    vitamin C (ASCORBIC ACID) 500 mg, Daily     No Known Allergies   I have reviewed the above medications and allergies     Objective:      Vitals Vitals:    10/14/24 1520   BP: 112/74   BP Location: Left arm   Patient Position: Sitting   Cuff Size: Large Adult   Pulse: 78   Resp: 18   SpO2: 94%   Weight: 85.7 kg (189 lb)   Height: 160 cm (63\")     Body mass index is 33.48 kg/m².    Physical Exam  Vitals reviewed.   Constitutional:       General: She is not in acute distress.     Appearance: She is not ill-appearing.   Cardiovascular:      Rate and Rhythm: Normal rate and regular rhythm.   Pulmonary:      Effort: Pulmonary effort is normal.      Breath sounds: Normal breath sounds.   Neurological:      Mental Status: She is alert.   Psychiatric:         Mood and Affect: Mood normal.         Behavior: Behavior normal.         Thought Content: Thought content normal.         Judgment: Judgment normal.          Physical Exam       Results       Assessment/Plan   Issues Addressed/ Plan   Diagnosis Plan   1. Preoperative examination        2. Primary osteoarthritis of both knees        3. Encounter for immunization           Assessment & Plan  1. Preoperative evaluation.  Her cholesterol levels have consistently been within the " normal range. The surgical team intends to discontinue her Plavix and Rybelsus prior to the procedure. The plan is to perform the second knee replacement 3 to 6 months after the first, depending on her recovery. It was explained that the second procedure is often more challenging due to anticipation.  Postoperative physical therapy will be initiated once she is discharged. She was advised to resume her diet and healthy lifestyle habits. A surgical clearance form will be signed and sent. She was encouraged to continue the exercises taught during physical therapy postoperatively to strengthen the supporting muscles and optimize the outcome of the new joint.    2. Medication Management.  She confirmed that she received the refill for Actos. No issues with her current medications were reported.               There are no Patient Instructions on file for this visit.   Follow up  recommended Return if symptoms worsen or fail to improve.   - Dragon voice recognition software was utilized to complete this chart.  Every reasonable attempt was made to edit and correct the text, however some incorrect words may remain.   Ferny Mosley DO    Patient or patient representative verbalized consent for the use of Ambient Listening during the visit with  Ferny Mosley DO for chart documentation. 10/27/2024  22:45 EDT

## 2024-11-08 ENCOUNTER — OFFICE VISIT (OUTPATIENT)
Dept: ORTHOPEDIC SURGERY | Facility: CLINIC | Age: 74
End: 2024-11-08
Payer: MEDICARE

## 2024-11-08 ENCOUNTER — OFFICE VISIT (OUTPATIENT)
Dept: FAMILY MEDICINE CLINIC | Facility: CLINIC | Age: 74
End: 2024-11-08
Payer: MEDICARE

## 2024-11-08 VITALS
BODY MASS INDEX: 32.78 KG/M2 | DIASTOLIC BLOOD PRESSURE: 74 MMHG | SYSTOLIC BLOOD PRESSURE: 122 MMHG | RESPIRATION RATE: 16 BRPM | HEART RATE: 78 BPM | HEIGHT: 63 IN | WEIGHT: 185 LBS | OXYGEN SATURATION: 96 %

## 2024-11-08 VITALS — WEIGHT: 185 LBS | BODY MASS INDEX: 32.78 KG/M2 | HEIGHT: 63 IN

## 2024-11-08 DIAGNOSIS — I10 HTN (HYPERTENSION), BENIGN: ICD-10-CM

## 2024-11-08 DIAGNOSIS — Z23 NEED FOR INFLUENZA VACCINATION: Primary | ICD-10-CM

## 2024-11-08 DIAGNOSIS — Z96.652 S/P TOTAL KNEE ARTHROPLASTY, LEFT: ICD-10-CM

## 2024-11-08 DIAGNOSIS — Z96.652 AFTERCARE FOLLOWING LEFT KNEE JOINT REPLACEMENT SURGERY: Primary | ICD-10-CM

## 2024-11-08 DIAGNOSIS — E11.65 TYPE 2 DIABETES MELLITUS WITH HYPERGLYCEMIA, WITHOUT LONG-TERM CURRENT USE OF INSULIN: ICD-10-CM

## 2024-11-08 DIAGNOSIS — Z47.1 AFTERCARE FOLLOWING LEFT KNEE JOINT REPLACEMENT SURGERY: Primary | ICD-10-CM

## 2024-11-08 PROCEDURE — 99024 POSTOP FOLLOW-UP VISIT: CPT | Performed by: INTERNAL MEDICINE

## 2024-11-08 NOTE — PROGRESS NOTES
Ferny Mosley,   [facility]  [address]  [place] [id number] [phone]    Subjective      Name Edilma Doss MRN [id number]    1950 AGE/SEX 73 y.o. / female      Chief Complaint Chief Complaint   Patient presents with    Knee Pain     Hospital follow up from left knee surgery. Doing good so far, will start PT on Monday.          Visit History for  2024    Edilma Doss is a 73 y.o. female who presented today for Knee Pain (Hospital follow up from left knee surgery. Doing good so far, will start PT on Monday. )       History of Present Illness  The patient is a 73-year-old female who presents for evaluation of multiple medical concerns. She is accompanied by an adult male.    She reports a successful surgery but is concerned about a knot that protrudes when she lies down. She noticed a significant reduction in the size of the knot after a 4-hour shopping trip using a cane. She has been participating in physical therapy at home for the past 2 weeks and plans to continue at [facility]. She performs exercises twice daily, including leaning forward five times, and applies ice to her knee. She received an ultrasound-guided injection in her knee and reports that the first four days post-surgery were extremely painful.    She is able to use the bathroom independently and reports no constipation. She takes oxycodone at night to manage pain, which she describes as an ache that disrupts her sleep. She also takes acetaminophen and a sleeping pill. She has approximately 70 oxycodone pills remaining. She experiences back pain from prolonged sitting and takes two Tylenol before bed to alleviate this.    She has lost weight due to a reduced appetite, which she attributes to her Rybelsus medication. She applies a cream for itching under her arm and breast and uses a spray deodorant under her breasts.       Medications and Allergies   Current Outpatient  "Medications   Medication Instructions    aspirin 81 mg, Oral, Daily    atorvastatin (LIPITOR) 40 mg, Oral, Nightly    cholecalciferol (VITAMIN D3) 400 Units    CINNAMON PO Take  by mouth.    clopidogrel (PLAVIX) 75 mg, Daily    clotrimazole-betamethasone (LOTRISONE) 1-0.05 % cream 1 cream two times daily    escitalopram (LEXAPRO) 10 mg, Oral, Daily    ezetimibe (ZETIA) 10 mg, Oral, Daily    Flax Seed Oil 1,000 mg    Fluticasone-Umeclidin-Vilant (Trelegy Ellipta) 100-62.5-25 MCG/ACT inhaler 1 puff    Magnesium 250 MG tablet Take  by mouth.    metoprolol succinate XL (TOPROL-XL) 50 mg, Oral, Daily    nitroglycerin (NITROSTAT) 0.4 mg, Sublingual, Every 5 Minutes PRN, Take no more than 3 doses in 15 minutes.    ondansetron (ZOFRAN) 4 mg, Oral, Every 8 Hours PRN    oxyCODONE-acetaminophen (PERCOCET) 5-325 MG per tablet 1 tablet, Oral, Every 4 Hours PRN    Rybelsus 14 mg, Oral, Daily    sennosides-docusate (senna-docusate sodium) 8.6-50 MG per tablet 1 tablet, Oral, Daily    vitamin C (ASCORBIC ACID) 500 mg, Daily     No Known Allergies   I have reviewed the above medications and allergies     Objective:      Vitals Vitals:    11/08/24 1110   BP: 122/74   BP Location: Left arm   Patient Position: Sitting   Cuff Size: Adult   Pulse: 78   Resp: 16   SpO2: 96%   Weight: 83.9 kg (185 lb)   Height: 160 cm (62.99\")     Body mass index is 32.78 kg/m².    Physical Exam  Vitals reviewed.   Constitutional:       General: She is not in acute distress.     Appearance: She is not ill-appearing.   Pulmonary:      Effort: Pulmonary effort is normal.   Psychiatric:         Mood and Affect: Mood normal.         Behavior: Behavior normal.         Thought Content: Thought content normal.         Judgment: Judgment normal.          Physical Exam       Results       Assessment/Plan   Issues Addressed/ Plan   Diagnosis Plan   1. Need for influenza vaccination  Fluzone High-Dose 65+yrs      2. HTN (hypertension), benign        3. S/P total knee " arthroplasty, left        4. Type 2 diabetes mellitus with hyperglycemia, without long-term current use of insulin           Assessment & Plan  1. Post-knee replacement status.  The surgical site appears to be healing well with minimal redness and swelling. There is a small knot noted, which has reduced in size with activity. She has demonstrated good progress in physical therapy, achieving a range of motion beyond 90 degrees. The incision site is clean with a healthy scab formation. She is advised to continue with her physical therapy regimen but not to overdo it. For pain management, she can take Tylenol or acetaminophen. She is currently taking oxycodone at night for pain relief but is encouraged to see if she can manage with just Tylenol. She should ensure the area is kept dry and clean. The use of shapewear is recommended to address the issue of pannus.     2. Diabetes Mellitus.  Her A1c has not been checked since July. A repeat A1c test will be conducted in 3 months.             There are no Patient Instructions on file for this visit.   Follow up  recommended Return in about 3 months (around 2/8/2025) for Diabetes.   - Dragon voice recognition software was utilized to complete this chart.  Every reasonable attempt was made to edit and correct the text, however some incorrect words may remain.   Ferny Mosley DO    Patient or patient representative verbalized consent for the use of Ambient Listening during the visit with  Ferny Mosley DO for chart documentation. 11/22/2024  22:45 EST

## 2024-11-08 NOTE — PROGRESS NOTES
Subjective:     Patient ID: Edilma Doss is a 73 y.o. female.    Chief Complaint:    History of Present Illness  Edilma Doss returns to clinic today for evaluation of left knee status post total knee arthroplasty 16 days ago on 10/23/2024.  The patient is doing quite well.  She denies any fever chills or drainage from her surgical incision.  She states the pain and swelling are improving.  She has been doing physical therapy.  She is ambulatory today with a cane.     Social History     Occupational History    Not on file   Tobacco Use    Smoking status: Former     Current packs/day: 0.00     Average packs/day: 2.0 packs/day for 32.9 years (65.9 ttl pk-yrs)     Types: Cigarettes     Start date: 1966     Quit date: 11/10/1999     Years since quittin.0     Passive exposure: Past    Smokeless tobacco: Never   Vaping Use    Vaping status: Never Used   Substance and Sexual Activity    Alcohol use: Never    Drug use: Never    Sexual activity: Not Currently     Partners: Male     Birth control/protection: Tubal ligation      Past Medical History:   Diagnosis Date    Allergic     Anxiety     Bursitis of hip hurts when walking and sleeping on side.    COPD (chronic obstructive pulmonary disease)     Coronary artery disease     stent placed    CTS (carpal tunnel syndrome) yes,  ?    Depression     Elevated cholesterol     Frozen shoulder     Hip arthrosis 1122/    pain when walking    Hyperlipidemia     Hypertension     Knee sprain twisted it Sore    Knee swelling     Obesity     Periarthritis of shoulder grinds    Sleep apnea     CPAP    Tear of meniscus of knee left knee, 5yrs. ago now right knee I think.     Past Surgical History:   Procedure Laterality Date    CHOLECYSTECTOMY      COLONOSCOPY      CORONARY STENT PLACEMENT      HAND SURGERY  both thumb joints from repitation movements    SHOULDER SURGERY Left     TONSILLECTOMY      TOTAL KNEE ARTHROPLASTY Left 10/23/2024    Procedure: TOTAL KNEE  "ARTHROPLASTY WITH CORI ROBOT;  Surgeon: Rowdy Gracia MD;  Location: [facility];  Service: Robotics - Ortho;  Laterality: Left;    TRIGGER POINT INJECTION  knleft knee    TUBAL ABDOMINAL LIGATION         Family History   Problem Relation Age of Onset    Diabetes Mother     Breast cancer Father     Cancer Sister     Breast cancer Maternal Aunt                  Objective:  Vitals:    24 1300   Weight: 83.9 kg (185 lb)   Height: 160 cm (62.99\")         24  1300   Weight: 83.9 kg (185 lb)     Body mass index is 32.78 kg/m².           Left Knee Exam     Tenderness   Left knee tenderness location: global.    Range of Motion   Extension:  5   Flexion:  110     Tests   Varus: negative Valgus: negative  Lachman:  Anterior - negative    Posterior - negative  Drawer:  Anterior - negative     Posterior - negative    Other   Erythema: absent  Scars: present  Sensation: normal  Pulse: present  Effusion: no effusion present               Imagin views of the left knee were ordered and reviewed by myself in the office today  Indication: left knee replacement  Findings: X-rays demonstrate a cemented left total knee arthroplasty with an unresurfaced patella with implants in expected position no signs of loosening fracture, dislocation, subluxation, wear or subsidence.  No new acute osseous abnormality.  Comparative studies: Immediate postop radiographs      Assessment:        1. Aftercare following left knee joint replacement surgery           Plan:          Discussed treatment options at length with patient at today's visit. I discussed with the patient that they are doing well from my standpoint.  The patient has achieved 0 to 90 degrees of flexion.  I discussed with them that in the coming weeks the most important thing is physical therapy particularly strengthening and range of motion exercises.  I would like the patient to achieve 0 to 120 degrees of flexion by the time they follow-up in 4 weeks for the 6-week " follow-up visit.  I discussed with them that it is normal to have stiffness achiness and pain particularly at night and in the morning.  This will continue to improve as time goes on and may continue to improve for 6 to 12 months postoperatively. I offered the patient a narcotic refill.  The patient's surgical incision is healing without signs of infection and there are no signs of ligamentous instability.  I would like the patient to avoid soaking or submersion of the surgical incision in water until all the scabbing has come off or for at least another 2 weeks.  They should clean the incision daily with soapy water in the shower.  I would like the patient to notify our office immediately if they note any increasing redness, pain, fevers, chills, or drainage of any kind from their surgical incision as this would be abnormal at this point in time.  I offered the patient a narcotic refill.  They should continue to take the anticoagulant for a total of 4 weeks.  I would like to see the patient back in 4 weeks for 6-week follow-up appointment.  The patient voiced understanding and agreement with the plan.  Follow up: 4 weeks without x-rays      Edilma Doss was in agreement with plan and had all questions answered.     Medications:  No orders of the defined types were placed in this encounter.      Followup:  No follow-ups on file.    Diagnoses and all orders for this visit:    1. Aftercare following left knee joint replacement surgery (Primary)  -     XR Knee 1 or 2 View Left          Dictated utilizing Dragon dictation

## 2024-11-19 DIAGNOSIS — I73.9 PAD (PERIPHERAL ARTERY DISEASE): ICD-10-CM

## 2024-11-19 DIAGNOSIS — I10 HTN (HYPERTENSION), BENIGN: ICD-10-CM

## 2024-11-20 RX ORDER — EZETIMIBE 10 MG/1
10 TABLET ORAL DAILY
Qty: 90 TABLET | Refills: 3 | Status: SHIPPED | OUTPATIENT
Start: 2024-11-20

## 2024-11-22 PROBLEM — E11.65 TYPE 2 DIABETES MELLITUS WITH HYPERGLYCEMIA, WITHOUT LONG-TERM CURRENT USE OF INSULIN: Status: ACTIVE | Noted: 2024-11-22

## 2024-12-10 ENCOUNTER — TELEPHONE (OUTPATIENT)
Dept: FAMILY MEDICINE CLINIC | Facility: CLINIC | Age: 74
End: 2024-12-10
Payer: MEDICARE

## 2024-12-10 ENCOUNTER — OFFICE VISIT (OUTPATIENT)
Dept: ORTHOPEDIC SURGERY | Facility: CLINIC | Age: 74
End: 2024-12-10
Payer: MEDICARE

## 2024-12-10 VITALS — BODY MASS INDEX: 32.78 KG/M2 | HEIGHT: 63 IN | WEIGHT: 185 LBS

## 2024-12-10 DIAGNOSIS — Z47.1 AFTERCARE FOLLOWING LEFT KNEE JOINT REPLACEMENT SURGERY: Primary | ICD-10-CM

## 2024-12-10 DIAGNOSIS — Z96.652 AFTERCARE FOLLOWING LEFT KNEE JOINT REPLACEMENT SURGERY: Primary | ICD-10-CM

## 2024-12-10 PROCEDURE — 99024 POSTOP FOLLOW-UP VISIT: CPT | Performed by: INTERNAL MEDICINE

## 2024-12-10 NOTE — TELEPHONE ENCOUNTER
"  Caller: Edilma Doss \"ALIA\"    Relationship: Self    Best call back number: [phone]    Who are you requesting to speak with (clinical staff, provider,  specific staff member): CLINICAL    What was the call regarding: CHECKING ON STATUS OF INSURANCE COVERAGE FOR   Rybelsus 14 MG tablet   PATIENT STATES SHE BROUGHT PAPERWORK IN FOR DR MARIEE TO READ ABOUT INSURANCE NOT WANTING TO COVER MEDICATION.   PLEASE ADVISE   "

## 2024-12-10 NOTE — PROGRESS NOTES
Subjective:     Patient ID: Edilma Doss is a 74 y.o. female.    Chief Complaint:    History of Present Illness  Edilma Doss returns to clinic today for evaluation of left knee status post total knee arthroplasty 7 weeks ago.  The patient is doing remarkably well.  She is happy with the result.  She states physical therapy has been going well.  She denies any fever chills or drainage from her surgical incision.  Overall she is progressing quite well.  She is ambulatory today with no assistive device and no limp     Social History     Occupational History    Not on file   Tobacco Use    Smoking status: Former     Current packs/day: 0.00     Average packs/day: 2.0 packs/day for 32.9 years (65.9 ttl pk-yrs)     Types: Cigarettes     Start date: 1966     Quit date: 11/10/1999     Years since quittin.1     Passive exposure: Past    Smokeless tobacco: Never   Vaping Use    Vaping status: Never Used   Substance and Sexual Activity    Alcohol use: Never    Drug use: Never    Sexual activity: Not Currently     Partners: Male     Birth control/protection: Tubal ligation      Past Medical History:   Diagnosis Date    Allergic     Anxiety     Bursitis of hip hurts when walking and sleeping on side.    COPD (chronic obstructive pulmonary disease)     Coronary artery disease     stent placed    CTS (carpal tunnel syndrome) yes,  ?    Depression     Elevated cholesterol     Frozen shoulder     Hip arthrosis 1122/    pain when walking    Hyperlipidemia     Hypertension     Knee sprain twisted it Sore    Knee swelling     Obesity     Periarthritis of shoulder grinds    Sleep apnea     CPAP    Tear of meniscus of knee left knee, 5yrs. ago now right knee I think.     Past Surgical History:   Procedure Laterality Date    CHOLECYSTECTOMY      COLONOSCOPY      CORONARY STENT PLACEMENT      HAND SURGERY  both thumb joints from repitation movements    SHOULDER SURGERY Left     TONSILLECTOMY      TOTAL KNEE  "ARTHROPLASTY Left 10/23/2024    Procedure: TOTAL KNEE ARTHROPLASTY WITH CORI ROBOT;  Surgeon: Rowdy Gracia MD;  Location: [facility];  Service: Robotics - Ortho;  Laterality: Left;    TRIGGER POINT INJECTION  knleft knee    TUBAL ABDOMINAL LIGATION         Family History   Problem Relation Age of Onset    Diabetes Mother     Breast cancer Father     Cancer Sister     Breast cancer Maternal Aunt                  Objective:  Vitals:    12/10/24 1306   Weight: 83.9 kg (185 lb)   Height: 160 cm (62.99\")         12/10/24  1306   Weight: 83.9 kg (185 lb)     Body mass index is 32.78 kg/m².        Left Knee Exam     Muscle Strength   The patient has normal left knee strength.    Tenderness   The patient is experiencing no tenderness.     Range of Motion   Extension:  0   Flexion:  110     Tests   Varus: negative Valgus: negative  Lachman:  Anterior - negative    Posterior - negative  Drawer:  Anterior - negative     Posterior - negative    Other   Erythema: absent  Scars: present  Sensation: normal  Pulse: present  Swelling: mild  Effusion: no effusion present               Imaging: no new    Assessment:        1. Aftercare following left knee joint replacement surgery           Plan:  Discussed treatment options at length with patient at today's visit. I discussed with the patient that they are doing well from my standpoint.  The patient has achieved 0 to 110 degrees of flexion.  I discussed with them that it is important to continue working on strengthening and range of motion exercises.  They should continue to attend physical therapy until they are discharged by the therapist or until they feel like they are no longer making improvements.  I discussed with them that it is normal to have stiffness achiness and pain particularly at night and in the morning.  This will continue to improve as time goes on and may continue to improve for 6 to 12 months postoperatively. I offered the patient a narcotic refill.  The " patient's surgical incision is healed without signs of infection and there are no signs of ligamentous instability.  It is now okay for the patient to soak or submerge the surgical incision underwater.  They should clean the incision daily with soapy water in the shower.  I would like the patient to notify our office immediately if they note any increasing redness, pain, fevers, chills, or drainage of any kind from their surgical incision as this would be abnormal at this point in time.  I offered the patient a narcotic refill.  They may discontinue the anticoagulant from my standpoint unless prescribed by another provider prior to surgery.  I would like to see the patient back in 6 weeks for 12-week follow-up appointment.  The patient voiced understanding and agreement with the plan.   Follow up: 6 weeks with 4 standing views of the right knee and 2 views of the left knee      Edilma K Romario was in agreement with plan and had all questions answered.     Medications:  No orders of the defined types were placed in this encounter.      Followup:  No follow-ups on file.    Diagnoses and all orders for this visit:    1. Aftercare following left knee joint replacement surgery (Primary)          Dictated utilizing Dragon dictation   Answers submitted by the patient for this visit:  Primary Reason for Visit (Submitted on 12/4/2024)  What is the primary reason for your visit?: Problem Not Listed

## 2024-12-18 RX ORDER — METOPROLOL SUCCINATE 50 MG/1
50 TABLET, EXTENDED RELEASE ORAL DAILY
Qty: 90 TABLET | Refills: 3 | Status: SHIPPED | OUTPATIENT
Start: 2024-12-18

## 2025-01-14 ENCOUNTER — OFFICE VISIT (OUTPATIENT)
Dept: CARDIOLOGY | Facility: CLINIC | Age: 75
End: 2025-01-14
Payer: MEDICARE

## 2025-01-14 VITALS
OXYGEN SATURATION: 96 % | WEIGHT: 191.2 LBS | HEIGHT: 63 IN | BODY MASS INDEX: 33.88 KG/M2 | DIASTOLIC BLOOD PRESSURE: 78 MMHG | SYSTOLIC BLOOD PRESSURE: 118 MMHG

## 2025-01-14 DIAGNOSIS — I10 HTN (HYPERTENSION), BENIGN: ICD-10-CM

## 2025-01-14 DIAGNOSIS — I73.9 PAD (PERIPHERAL ARTERY DISEASE): ICD-10-CM

## 2025-01-14 DIAGNOSIS — I25.10 CAD IN NATIVE ARTERY: Primary | ICD-10-CM

## 2025-01-14 NOTE — PATIENT INSTRUCTIONS
All you need is aspirin 81mg per day. Otherwise, you can stop the plavix/clopidogrel medication in the event you are still taking it.

## 2025-01-14 NOTE — PROGRESS NOTES
[facility]    Subjective:     Encounter Date:01/14/25      Patient ID: Edilmanatasha Doss is a 74 y.o. female.    Chief Complaint:   Chief Complaint   Patient presents with    CAD in native artery     Patient is in the office today for her 6 month follow up appointment.    Establish care for CAD  History of Present Illness    Ms. Doss is a 74 y.o.lady with a past medical history hypertension, hyperlipidemia, coronary disease status post PCI 2010's at [facility], borderline ischemic cardiomyopathy who presents for further evaluation.      She presents today with her  who is also a patient of mine.    She previously followed the cardiologist at [facility] in [place].      She reports intermittent palpitation episodes which is having randomly, sporadically for minutes at a time over the last several years.  Subsequent Holter monitor revealed some short runs of atrial tachycardia.  Improved with metoprolol for which she continues.    As part of her work-up for dyspnea, she underwent a stress test which in 2018 showed a small amount of apical ischemia reportedly, is being managed medically.  She has no overt angina.  She also reports that she had ABIs which revealed peripheral vascular disease, no significant claudication.  An echo in January 2019 showed LVEF of 45 to 50%.    She had intentionally lost a lot of weight over the last several years.  She reports her functional capacity is good, she is able to escalate flights of stairs without stopping, she is able to do yard work and she has no angina or any dyspnea symptoms.  She underwent a repeat echocardiogram but showed recovery of her LVEF.    No other complaints today.  No angina.  She still has some bruising on aspirin monotherapy.    Echocardiogram January 2024:    Left ventricular systolic function is normal. Calculated left ventricular EF = 58.8%    Left ventricular diastolic function was normal.     Estimated right ventricular systolic pressure from tricuspid regurgitation is normal (<35 mmHg).    Holter monitor November 2023:    A relatively benign monitor study.    There were a total of 3 supraventricular runs with longest lasting 19.2 seconds with average heart rate of 110 bpm.    A total of 14 patient triggered and 13 diary events submitted.  Symptoms included skipped beats/lightheadedness.  Most did not have any arrhythmia correlation but few episodes correlate with premature ventricular complexes.    The following portions of the patient's history were reviewed and updated as appropriate: allergies, current medications, past family history, past medical history, past social history, past surgical history and problem list.    Past Medical History:   Diagnosis Date    Allergic     Anxiety     Bursitis of hip hurts when walking and sleeping on side.    COPD (chronic obstructive pulmonary disease)     Coronary artery disease     stent placed    CTS (carpal tunnel syndrome) yes, 1986 ?    Depression     Elevated cholesterol     Frozen shoulder     Hip arthrosis 1122/    pain when walking    Hyperlipidemia     Hypertension     Knee sprain twisted it Sore    Knee swelling     Obesity     Periarthritis of shoulder grinds    Sleep apnea     CPAP    Tear of meniscus of knee left knee, 5yrs. ago now right knee I think.       Past Surgical History:   Procedure Laterality Date    CHOLECYSTECTOMY      COLONOSCOPY      CORONARY STENT PLACEMENT      HAND SURGERY  both thumb joints from repitation movements    SHOULDER SURGERY Left     TONSILLECTOMY      TOTAL KNEE ARTHROPLASTY Left 10/23/2024    Procedure: TOTAL KNEE ARTHROPLASTY WITH CORI ROBOT;  Surgeon: Rowdy Gracia MD;  Location: [facility];  Service: Robotics - Ortho;  Laterality: Left;    TRIGGER POINT INJECTION  knleft knee    TUBAL ABDOMINAL LIGATION           Procedures       Objective:     Vitals:    01/14/25 1439   BP: 118/78   BP Location: Left arm  "  Patient Position: Sitting   Cuff Size: Adult   SpO2: 96%   Weight: 86.7 kg (191 lb 3.2 oz)   Height: 160 cm (62.99\")           Constitutional:       Appearance: Healthy appearance. Not in distress.   Neck:      Vascular: JVD normal.   Pulmonary:      Effort: Pulmonary effort is normal.      Breath sounds: Normal breath sounds.   Cardiovascular:      PMI at left midclavicular line. Normal rate. Regular rhythm. Normal S2.       Murmurs: There is no murmur.   Pulses:     Decreased pulses.      Radial: 2+ bilaterally.     Dorsalis pedis: 2+ bilaterally.     Posterior tibial: 1+ on the left side and 2+ on the right side.  Edema:     Peripheral edema absent.   Skin:     General: Skin is warm and dry.   Neurological:      General: No focal deficit present.      Mental Status: Alert, oriented to person, place, and time and oriented to person, place and time.   Psychiatric:         Mood and Affect: Mood and affect normal.         Lab Review:     Lipid Panel          7/1/2024    00:00   Lipid Panel   Total Cholesterol 124    Triglycerides 130    HDL Cholesterol 57    VLDL Cholesterol 23    LDL Cholesterol  44      BUN   Date Value Ref Range Status   10/24/2024 25 (H) 8 - 23 mg/dL Final     Creatinine   Date Value Ref Range Status   10/24/2024 0.93 0.57 - 1.00 mg/dL Final     Potassium   Date Value Ref Range Status   10/24/2024 4.2 3.5 - 5.2 mmol/L Final     ALT (SGPT)   Date Value Ref Range Status   07/01/2024 25 0 - 32 IU/L Final     AST (SGOT)   Date Value Ref Range Status   07/01/2024 23 0 - 40 IU/L Final           Assessment:          Diagnosis Plan   1. CAD in native artery        2. PAD (peripheral artery disease)        3. HTN (hypertension), benign                     Plan:         Coronary disease status post PCI, remote, [facility]: Free of angina  Mild ischemic cardiomyopathy EF 45 to 50%, now with recovered LVEF 58%.   Continue metoprolol succinate 50 daily   Continue aspirin monotherapy.  She had " significant bruising on Plavix monotherapy.  Palpitations: Fleeting, short episodes that are likely symptomatic PVCs.  2-week patch Holter did not have any significant arrhythmias.  Controlled on metoprolol succinate  Hyperlipidemia, goal LDL less than 70  LDL 44 on current regimen of Ezetimibe.  Continue  Mild PAD.  Noted from previous cardiac evaluation.  She has some complaint of her right upper extremity becoming slightly cold after she drinks coffee in the morning by my exam today her radial pulses are equal bilateral.  There may be component of mild PAD but I see nothing high risk year.  Continue medical therapy.      RTC 6 months.    Vega Javier MD  [facility]  01/14/25  13:04 EDT       Current Outpatient Medications:     aspirin 81 MG EC tablet, Take 1 tablet by mouth Daily., Disp: 90 tablet, Rfl: 3    atorvastatin (LIPITOR) 40 MG tablet, Take 1 tablet by mouth Every Night., Disp: 90 tablet, Rfl: 3    cholecalciferol (VITAMIN D3) 10 MCG (400 UNIT) tablet, Take 1 tablet by mouth., Disp: , Rfl:     CINNAMON PO, Take  by mouth., Disp: , Rfl:     clotrimazole-betamethasone (LOTRISONE) 1-0.05 % cream, 1 cream two times daily, Disp: , Rfl:     escitalopram (LEXAPRO) 10 MG tablet, TAKE 1 TABLET BY MOUTH DAILY, Disp: 90 tablet, Rfl: 3    ezetimibe (ZETIA) 10 MG tablet, TAKE 1 TABLET BY MOUTH DAILY, Disp: 90 tablet, Rfl: 3    Flaxseed, Linseed, (Flax Seed Oil) 1000 MG capsule, Take 1,000 mg by mouth., Disp: , Rfl:     Fluticasone-Umeclidin-Vilant (Trelegy Ellipta) 100-62.5-25 MCG/ACT inhaler, Inhale 1 puff., Disp: , Rfl:     Magnesium 250 MG tablet, Take  by mouth., Disp: , Rfl:     metoprolol succinate XL (TOPROL-XL) 50 MG 24 hr tablet, TAKE 1 TABLET BY MOUTH DAILY, Disp: 90 tablet, Rfl: 3    nitroglycerin (NITROSTAT) 0.4 MG SL tablet, Place 1 tablet under the tongue Every 5 (Five) Minutes As Needed for Chest Pain. Take no more than 3 doses in 15 minutes., Disp: 30 tablet, Rfl: 11    Rybelsus 14  MG tablet, TAKE 1 TABLET BY MOUTH DAILY, Disp: 90 tablet, Rfl: 3    sennosides-docusate (senna-docusate sodium) 8.6-50 MG per tablet, Take 1 tablet by mouth Daily., Disp: 30 tablet, Rfl: 0    vitamin C (ASCORBIC ACID) 250 MG tablet, Take 2 tablets by mouth Daily., Disp: , Rfl:          Return in about 6 months (around 7/14/2025).      Part of this note may be an electronic transcription/translation of spoken language to printed text using the Dragon Dictation System.

## 2025-01-21 ENCOUNTER — OFFICE VISIT (OUTPATIENT)
Dept: ORTHOPEDIC SURGERY | Facility: CLINIC | Age: 75
End: 2025-01-21
Payer: MEDICARE

## 2025-01-21 ENCOUNTER — OFFICE VISIT (OUTPATIENT)
Dept: FAMILY MEDICINE CLINIC | Facility: CLINIC | Age: 75
End: 2025-01-21
Payer: MEDICARE

## 2025-01-21 VITALS
BODY MASS INDEX: 34.2 KG/M2 | OXYGEN SATURATION: 100 % | RESPIRATION RATE: 18 BRPM | SYSTOLIC BLOOD PRESSURE: 112 MMHG | DIASTOLIC BLOOD PRESSURE: 78 MMHG | HEIGHT: 63 IN | HEART RATE: 58 BPM | WEIGHT: 193 LBS

## 2025-01-21 VITALS — HEIGHT: 63 IN | BODY MASS INDEX: 33.84 KG/M2 | WEIGHT: 191 LBS

## 2025-01-21 DIAGNOSIS — J40 BRONCHITIS: ICD-10-CM

## 2025-01-21 DIAGNOSIS — E55.9 VITAMIN D DEFICIENCY: ICD-10-CM

## 2025-01-21 DIAGNOSIS — I10 HTN (HYPERTENSION), BENIGN: ICD-10-CM

## 2025-01-21 DIAGNOSIS — E11.65 TYPE 2 DIABETES MELLITUS WITH HYPERGLYCEMIA, WITHOUT LONG-TERM CURRENT USE OF INSULIN: Primary | ICD-10-CM

## 2025-01-21 DIAGNOSIS — D64.9 ANEMIA, UNSPECIFIED TYPE: ICD-10-CM

## 2025-01-21 DIAGNOSIS — E78.5 DYSLIPIDEMIA, GOAL LDL BELOW 70: ICD-10-CM

## 2025-01-21 DIAGNOSIS — Z47.1 AFTERCARE FOLLOWING LEFT KNEE JOINT REPLACEMENT SURGERY: Primary | ICD-10-CM

## 2025-01-21 DIAGNOSIS — Z96.652 AFTERCARE FOLLOWING LEFT KNEE JOINT REPLACEMENT SURGERY: Primary | ICD-10-CM

## 2025-01-21 PROCEDURE — 99214 OFFICE O/P EST MOD 30 MIN: CPT | Performed by: STUDENT IN AN ORGANIZED HEALTH CARE EDUCATION/TRAINING PROGRAM

## 2025-01-21 PROCEDURE — 99024 POSTOP FOLLOW-UP VISIT: CPT | Performed by: INTERNAL MEDICINE

## 2025-01-21 PROCEDURE — 3078F DIAST BP <80 MM HG: CPT | Performed by: STUDENT IN AN ORGANIZED HEALTH CARE EDUCATION/TRAINING PROGRAM

## 2025-01-21 PROCEDURE — 1160F RVW MEDS BY RX/DR IN RCRD: CPT | Performed by: STUDENT IN AN ORGANIZED HEALTH CARE EDUCATION/TRAINING PROGRAM

## 2025-01-21 PROCEDURE — 1125F AMNT PAIN NOTED PAIN PRSNT: CPT | Performed by: STUDENT IN AN ORGANIZED HEALTH CARE EDUCATION/TRAINING PROGRAM

## 2025-01-21 PROCEDURE — 3074F SYST BP LT 130 MM HG: CPT | Performed by: STUDENT IN AN ORGANIZED HEALTH CARE EDUCATION/TRAINING PROGRAM

## 2025-01-21 PROCEDURE — 1159F MED LIST DOCD IN RCRD: CPT | Performed by: STUDENT IN AN ORGANIZED HEALTH CARE EDUCATION/TRAINING PROGRAM

## 2025-01-21 RX ORDER — CEPHALEXIN 500 MG/1
CAPSULE ORAL
Qty: 4 CAPSULE | Refills: 2 | Status: SHIPPED | OUTPATIENT
Start: 2025-01-21

## 2025-01-21 RX ORDER — AZITHROMYCIN 250 MG/1
TABLET, FILM COATED ORAL
Qty: 6 TABLET | Refills: 0 | Status: SHIPPED | OUTPATIENT
Start: 2025-01-21

## 2025-01-21 NOTE — PROGRESS NOTES
Ferny Mosley,   [facility]  [address]  [place] [address] [phone]    Subjective      Name Edilma Doss MRN [id number]    1950 AGE/SEX 74 y.o. / female      Chief Complaint Chief Complaint   Patient presents with    Med Management     3 month check up     Sinusitis     Having issues with congestion and ear fullness. X 2 weeks has been taking Mucinex to help with symptoms          Visit History for  2025    Edilma Doss is a 74 y.o. female who presented today for Med Management (3 month check up ) and Sinusitis (Having issues with congestion and ear fullness. X 2 weeks has been taking Mucinex to help with symptoms )       History of Present Illness  She has been on Rybelsus for over a year for her diabetes management.    She reports no history of iron deficiency and maintains a daily regimen of iron supplements. She does not take vitamin B12.    She has been experiencing a cold with significant phlegm production for the past 2 weeks. The severity of her symptoms has decreased. She occasionally experiences earaches that resolve spontaneously but recur after a few days. She has previously used eardrops for symptom management. She continues to use sinus medication and a neti pot. She reports an overall improvement in her condition. She has been managing her symptoms with sinus medication and Mucinex.    She is currently on Trulicity for her COPD.         Medications and Allergies   Current Outpatient Medications   Medication Instructions    aspirin 81 mg, Oral, Daily    azithromycin (Zithromax Z-[place]) 250 MG tablet Take 2 tablets by mouth on day 1, then 1 tablet daily on days 2-5    cephalexin (KEFLEX) 500 MG capsule 2 grams once 1 hour prior to dental appointment    cholecalciferol (VITAMIN D3) 400 Units    CINNAMON PO Take  by mouth.    clotrimazole-betamethasone (LOTRISONE) 1-0.05 % cream 1 cream two times daily    escitalopram (LEXAPRO)  "10 mg, Oral, Daily    ezetimibe (ZETIA) 10 mg, Oral, Daily    Flax Seed Oil 1,000 mg    Fluticasone-Umeclidin-Vilant (Trelegy Ellipta) 100-62.5-25 MCG/ACT inhaler 1 puff    Magnesium 250 MG tablet Take  by mouth.    metoprolol succinate XL (TOPROL-XL) 50 mg, Oral, Daily    nitroglycerin (NITROSTAT) 0.4 mg, Sublingual, Every 5 Minutes PRN, Take no more than 3 doses in 15 minutes.    Rybelsus 14 mg, Oral, Daily    sennosides-docusate (senna-docusate sodium) 8.6-50 MG per tablet 1 tablet, Oral, Daily    vitamin C (ASCORBIC ACID) 500 mg, Daily     No Known Allergies   I have reviewed the above medications and allergies     Objective:      Vitals Vitals:    01/21/25 1409   BP: 112/78   BP Location: Left arm   Patient Position: Sitting   Cuff Size: Adult   Pulse: 58   Resp: 18   SpO2: 100%   Weight: 87.5 kg (193 lb)   Height: 160 cm (63\")     Body mass index is 34.19 kg/m².    Physical Exam  Vitals reviewed.   Constitutional:       General: She is not in acute distress.     Appearance: She is not ill-appearing.   HENT:      Right Ear: Tympanic membrane and external ear normal. There is impacted cerumen.      Left Ear: Tympanic membrane, ear canal and external ear normal.   Pulmonary:      Effort: Pulmonary effort is normal.      Breath sounds: Rhonchi present.   Psychiatric:         Mood and Affect: Mood normal.         Behavior: Behavior normal.         Thought Content: Thought content normal.         Judgment: Judgment normal.          Physical Exam       Results       Assessment/Plan   Issues Addressed/ Plan   Diagnosis Plan   1. Type 2 diabetes mellitus with hyperglycemia, without long-term current use of insulin  Hemoglobin A1c    Microalbumin / Creatinine Urine Ratio - Urine, Clean Catch      2. HTN (hypertension), benign  Comprehensive Metabolic Panel      3. Vitamin D deficiency        4. Dyslipidemia, goal LDL below 70  Lipid Panel      5. Anemia, unspecified type  Iron Profile    CBC w AUTO Differential    " Vitamin B12    Folate      6. Bronchitis  azithromycin (Zithromax Z-[place]) 250 MG tablet         Assessment & Plan  1. Diabetes mellitus.  Her A1c levels have not been assessed since July 2024. She is currently on Rybelsus, which has been effective in controlling her blood sugar levels. A comprehensive blood workup will be conducted to evaluate her liver and kidney function. She will continue her current medication regimen.    2. Anemia.  She was previously diagnosed with anemia but has since started taking iron supplements daily. Her blood levels will be reassessed to determine if her anemia has resolved.    3. Earache.  She has been experiencing fluid accumulation in her ear, likely due to swelling in the posterior nasal region. This has resulted in intermittent earaches. She is advised to continue using her neti pot for symptom relief. If her condition worsens, she should contact the office for an antibiotic prescription.    4. Cough.  She has been experiencing a lingering cough for the past 2 weeks, which is gradually improving. There is no evidence of a COPD exacerbation. She is advised to continue her current treatment regimen, including the use of her Trilogy inhaler. If her condition worsens, she should start taking the prescribed antibiotic.           There are no Patient Instructions on file for this visit.   Follow up  recommended Return in about 3 months (around 4/21/2025).   - Dragon voice recognition software was utilized to complete this chart.  Every reasonable attempt was made to edit and correct the text, however some incorrect words may remain.   Ferny Mosley DO    Patient or patient representative verbalized consent for the use of Ambient Listening during the visit with  Ferny Mosley DO for chart documentation. 2/1/2025  10:17 EST

## 2025-01-21 NOTE — PROGRESS NOTES
Subjective:     Patient ID: Edilma Doss is a 74 y.o. female.    Chief Complaint:    History of Present Illness  Edilma Doss returns to clinic today for evaluation of left knee status post total knee arthroplasty 13 weeks ago on 10/23/2024.  She states that she still has some achiness in her knee but overall is doing much better than she was prior to surgery.  She is happy with the result.  She denies any fever chills or drainage from her surgical incision.  She is set to have some extensive dental work done in February including multiple tooth extractions.     Social History     Occupational History    Not on file   Tobacco Use    Smoking status: Former     Current packs/day: 0.00     Average packs/day: 2.0 packs/day for 32.9 years (65.9 ttl pk-yrs)     Types: Cigarettes     Start date: 1966     Quit date: 11/10/1999     Years since quittin.2     Passive exposure: Past    Smokeless tobacco: Never   Vaping Use    Vaping status: Never Used   Substance and Sexual Activity    Alcohol use: Never    Drug use: Never    Sexual activity: Not Currently     Partners: Male     Birth control/protection: Tubal ligation      Past Medical History:   Diagnosis Date    Allergic     Anxiety     Bursitis of hip hurts when walking and sleeping on side.    COPD (chronic obstructive pulmonary disease)     Coronary artery disease     stent placed    CTS (carpal tunnel syndrome) yes,  ?    Depression     Elevated cholesterol     Frozen shoulder     Hip arthrosis 1122/    pain when walking    Hyperlipidemia     Hypertension     Knee sprain twisted it Sore    Knee swelling     Obesity     Periarthritis of shoulder grinds    Sleep apnea     CPAP    Tear of meniscus of knee left knee, 5yrs. ago now right knee I think.     Past Surgical History:   Procedure Laterality Date    CHOLECYSTECTOMY      COLONOSCOPY      CORONARY STENT PLACEMENT      HAND SURGERY  both thumb joints from repitation movements    SHOULDER SURGERY  "Left     TONSILLECTOMY      TOTAL KNEE ARTHROPLASTY Left 10/23/2024    Procedure: TOTAL KNEE ARTHROPLASTY WITH CORI ROBOT;  Surgeon: Rowdy Gracia MD;  Location: [facility];  Service: Robotics - Ortho;  Laterality: Left;    TRIGGER POINT INJECTION  knleft knee    TUBAL ABDOMINAL LIGATION         Family History   Problem Relation Age of Onset    Diabetes Mother     Breast cancer Father     Cancer Sister     Breast cancer Maternal Aunt                  Objective:  Vitals:    01/21/25 1259   Weight: 86.6 kg (191 lb)   Height: 160 cm (63\")         01/21/25  1259   Weight: 86.6 kg (191 lb)     Body mass index is 33.83 kg/m².           Left Knee Exam     Tenderness   Left knee tenderness location: global mild.    Range of Motion   Extension:  0   Flexion:  120     Tests   Varus: negative Valgus: negative  Lachman:  Anterior - trace    Posterior - negative  Drawer:  Anterior - trace     Posterior - negative    Other   Erythema: absent  Scars: present  Sensation: normal  Pulse: present  Swelling: mild  Effusion: no effusion present               Imaging: no new    Assessment:        1. Aftercare following left knee joint replacement surgery           Plan:          Discussed treatment options at length with patient at today's visit. I discussed with the patient that they are doing well from my standpoint.  I am happy with the progress that they have made and they have achieved 0 to 120 degrees.  I would like them to continue to work on range of motion and strengthening exercises.  I discussed with them that it is normal to have stiffness achiness and pain particularly at night and in the morning.  This will continue to improve as time goes on and may continue to improve for 6 to 12 months postoperatively.   The patient's surgical incision is healed without signs of infection and there are no signs of ligamentous instability.  I would like the patient to notify our office immediately if they note any increasing redness, " pain, fevers, chills, or drainage of any kind from their surgical incision as this would be abnormal at this point in time.  I discussed with the patient that at this point in time we do not need to see them back for 9 months for a 1 year follow-up appointment.  I did discuss however that I am happy to see the patient sooner if issues questions or concerns arise.  The patient voiced understanding and agreement with the plan.  I called the patient and prescription for dental prophylaxis as she is set to have extensive dental work done in February.  She may have this from my standpoint.  Follow up: 2025 with 3 views of the left knee      Edilma Doss was in agreement with plan and had all questions answered.     Medications:  New Medications Ordered This Visit   Medications    cephalexin (KEFLEX) 500 MG capsule     Si grams once 1 hour prior to dental appointment     Dispense:  4 capsule     Refill:  2       Followup:  No follow-ups on file.    Diagnoses and all orders for this visit:    1. Aftercare following left knee joint replacement surgery (Primary)    Other orders  -     cephalexin (KEFLEX) 500 MG capsule; 2 grams once 1 hour prior to dental appointment  Dispense: 4 capsule; Refill: 2          Dictated utilizing Dragon dictation

## 2025-01-22 LAB
ALBUMIN SERPL-MCNC: 4.4 G/DL (ref 3.8–4.8)
ALBUMIN/CREAT UR: 16 MG/G CREAT (ref 0–29)
ALP SERPL-CCNC: 88 IU/L (ref 44–121)
ALT SERPL-CCNC: 17 IU/L (ref 0–32)
AST SERPL-CCNC: 13 IU/L (ref 0–40)
BASOPHILS # BLD AUTO: 0 X10E3/UL (ref 0–0.2)
BASOPHILS NFR BLD AUTO: 0 %
BILIRUB SERPL-MCNC: 0.2 MG/DL (ref 0–1.2)
BUN SERPL-MCNC: 18 MG/DL (ref 8–27)
BUN/CREAT SERPL: 25 (ref 12–28)
CALCIUM SERPL-MCNC: 9.9 MG/DL (ref 8.7–10.3)
CHLORIDE SERPL-SCNC: 101 MMOL/L (ref 96–106)
CHOLEST SERPL-MCNC: 209 MG/DL (ref 100–199)
CO2 SERPL-SCNC: 25 MMOL/L (ref 20–29)
CREAT SERPL-MCNC: 0.72 MG/DL (ref 0.57–1)
CREAT UR-MCNC: 96.1 MG/DL
EGFRCR SERPLBLD CKD-EPI 2021: 87 ML/MIN/1.73
EOSINOPHIL # BLD AUTO: 0.2 X10E3/UL (ref 0–0.4)
EOSINOPHIL NFR BLD AUTO: 2 %
ERYTHROCYTE [DISTWIDTH] IN BLOOD BY AUTOMATED COUNT: 12.6 % (ref 11.7–15.4)
FOLATE SERPL-MCNC: 15.5 NG/ML
GLOBULIN SER CALC-MCNC: 2.2 G/DL (ref 1.5–4.5)
GLUCOSE SERPL-MCNC: 84 MG/DL (ref 70–99)
HBA1C MFR BLD: 5.6 % (ref 4.8–5.6)
HCT VFR BLD AUTO: 42.7 % (ref 34–46.6)
HDLC SERPL-MCNC: 56 MG/DL
HGB BLD-MCNC: 13.8 G/DL (ref 11.1–15.9)
IMM GRANULOCYTES # BLD AUTO: 0 X10E3/UL (ref 0–0.1)
IMM GRANULOCYTES NFR BLD AUTO: 0 %
IRON SATN MFR SERPL: 23 % (ref 15–55)
IRON SERPL-MCNC: 80 UG/DL (ref 27–139)
LDLC SERPL CALC-MCNC: 123 MG/DL (ref 0–99)
LYMPHOCYTES # BLD AUTO: 3.4 X10E3/UL (ref 0.7–3.1)
LYMPHOCYTES NFR BLD AUTO: 38 %
MCH RBC QN AUTO: 28.6 PG (ref 26.6–33)
MCHC RBC AUTO-ENTMCNC: 32.3 G/DL (ref 31.5–35.7)
MCV RBC AUTO: 89 FL (ref 79–97)
MICROALBUMIN UR-MCNC: 15.7 UG/ML
MONOCYTES # BLD AUTO: 0.5 X10E3/UL (ref 0.1–0.9)
MONOCYTES NFR BLD AUTO: 6 %
NEUTROPHILS # BLD AUTO: 4.7 X10E3/UL (ref 1.4–7)
NEUTROPHILS NFR BLD AUTO: 54 %
PLATELET # BLD AUTO: 281 X10E3/UL (ref 150–450)
POTASSIUM SERPL-SCNC: 4.5 MMOL/L (ref 3.5–5.2)
PROT SERPL-MCNC: 6.6 G/DL (ref 6–8.5)
RBC # BLD AUTO: 4.82 X10E6/UL (ref 3.77–5.28)
SODIUM SERPL-SCNC: 139 MMOL/L (ref 134–144)
TIBC SERPL-MCNC: 348 UG/DL (ref 250–450)
TRIGL SERPL-MCNC: 170 MG/DL (ref 0–149)
UIBC SERPL-MCNC: 268 UG/DL (ref 118–369)
VIT B12 SERPL-MCNC: 649 PG/ML (ref 232–1245)
VLDLC SERPL CALC-MCNC: 30 MG/DL (ref 5–40)
WBC # BLD AUTO: 8.8 X10E3/UL (ref 3.4–10.8)

## 2025-02-20 DIAGNOSIS — I10 HTN (HYPERTENSION), BENIGN: ICD-10-CM

## 2025-02-20 DIAGNOSIS — I73.9 PAD (PERIPHERAL ARTERY DISEASE): ICD-10-CM

## 2025-02-21 RX ORDER — CLOPIDOGREL BISULFATE 75 MG/1
75 TABLET ORAL DAILY
Qty: 90 TABLET | Refills: 3 | OUTPATIENT
Start: 2025-02-21

## 2025-04-16 ENCOUNTER — TELEPHONE (OUTPATIENT)
Dept: ORTHOPEDIC SURGERY | Facility: CLINIC | Age: 75
End: 2025-04-16
Payer: MEDICARE

## 2025-04-16 NOTE — TELEPHONE ENCOUNTER
Returned call to patient, spoke with her and advised that if she is wanting surgery in October, she will need to come in around Aug/Sept to get a updated OV note and Xrays. Patient verbally understood.     Scheduled appointment with patient.

## 2025-04-16 NOTE — TELEPHONE ENCOUNTER
"  Caller: Edilma Doss \"ALIA\"    Relationship: Self    Best call back number: [phone]    What was the call regarding: PT WOULD LIKE TO MOVE FORWARD WITH R KNEE REPLACEMENT WITH DR BOYER. PLEASE CONTACT PT TO DISCUSS NEXT STEPS. PT WAS THINKING LATE OCTOBER 2025 IF POSSIBLE.     "

## 2025-04-21 ENCOUNTER — OFFICE VISIT (OUTPATIENT)
Dept: FAMILY MEDICINE CLINIC | Facility: CLINIC | Age: 75
End: 2025-04-21
Payer: MEDICARE

## 2025-04-21 VITALS
BODY MASS INDEX: 34.3 KG/M2 | OXYGEN SATURATION: 98 % | DIASTOLIC BLOOD PRESSURE: 68 MMHG | WEIGHT: 193.6 LBS | SYSTOLIC BLOOD PRESSURE: 126 MMHG | HEART RATE: 70 BPM | HEIGHT: 63 IN

## 2025-04-21 DIAGNOSIS — I10 HTN (HYPERTENSION), BENIGN: ICD-10-CM

## 2025-04-21 DIAGNOSIS — R21 RASH: Primary | ICD-10-CM

## 2025-04-21 DIAGNOSIS — E78.5 DYSLIPIDEMIA, GOAL LDL BELOW 70: ICD-10-CM

## 2025-04-21 DIAGNOSIS — E11.65 TYPE 2 DIABETES MELLITUS WITH HYPERGLYCEMIA, WITHOUT LONG-TERM CURRENT USE OF INSULIN: ICD-10-CM

## 2025-04-21 RX ORDER — AMOXICILLIN 500 MG/1
CAPSULE ORAL
COMMUNITY
Start: 2025-04-01

## 2025-04-21 RX ORDER — CLOTRIMAZOLE AND BETAMETHASONE DIPROPIONATE 10; .64 MG/G; MG/G
CREAM TOPICAL 2 TIMES DAILY
Qty: 45 G | Refills: 2 | Status: SHIPPED | OUTPATIENT
Start: 2025-04-21

## 2025-04-21 NOTE — PROGRESS NOTES
Ferny Mosley DO  [facility]  [address]  [place] [id number] [phone]    Subjective      Name Edilma Doss MRN [id number]    1950 AGE/SEX 74 y.o. / female      Chief Complaint Chief Complaint   Patient presents with    Diabetes     3 month check up          Visit History for  2025    Edilma Doss is a 74 y.o. female who presented today for Diabetes (3 month check up )       History of Present Illness  The patient presents for evaluation of diabetes management and right knee pain.    A recent increase in food intake is reported, attributed to the consumption of Werther's candies. Despite this, dietary restrictions are being adhered to. Recent lab results indicate improvement, leading to the discontinuation of prediabetic medication. Currently, Rybelsus is being used primarily as an appetite suppressant, with three refills remaining.    A right knee procedure is scheduled for 10/2025. The left knee, previously operated on, continues to exhibit stiffness but is otherwise doing well.       Medications and Allergies   Current Outpatient Medications   Medication Instructions    amoxicillin (AMOXIL) 500 MG capsule TAKE FOUR CAPSULES BY MOUTH ONE HOUR BEFORE APPOINTMENT    aspirin 81 mg, Oral, Daily    cholecalciferol (VITAMIN D3) 400 Units    CINNAMON PO Take  by mouth.    clotrimazole-betamethasone (LOTRISONE) 1-0.05 % cream Topical, 2 Times Daily    escitalopram (LEXAPRO) 10 mg, Oral, Daily    ezetimibe (ZETIA) 10 mg, Oral, Daily    Flax Seed Oil 1,000 mg    Fluticasone-Umeclidin-Vilant (Trelegy Ellipta) 100-62.5-25 MCG/ACT inhaler 1 puff    Magnesium 250 MG tablet Take  by mouth.    metoprolol succinate XL (TOPROL-XL) 50 mg, Oral, Daily    nitroglycerin (NITROSTAT) 0.4 mg, Sublingual, Every 5 Minutes PRN, Take no more than 3 doses in 15 minutes.    Rybelsus 14 mg, Oral, Daily    sennosides-docusate (senna-docusate sodium) 8.6-50 MG per tablet 1  "tablet, Oral, Daily    vitamin C (ASCORBIC ACID) 500 mg, Daily     No Known Allergies   I have reviewed the above medications and allergies     Objective:      Vitals Vitals:    04/21/25 1419   BP: 126/68   BP Location: Left arm   Patient Position: Sitting   Cuff Size: Large Adult   Pulse: 70   SpO2: 98%   Weight: 87.8 kg (193 lb 9.6 oz)   Height: 160 cm (63\")     Body mass index is 34.29 kg/m².    Physical Exam  Vitals reviewed.   Constitutional:       General: She is not in acute distress.     Appearance: She is not ill-appearing.   Pulmonary:      Effort: Pulmonary effort is normal.   Psychiatric:         Mood and Affect: Mood normal.         Behavior: Behavior normal.         Thought Content: Thought content normal.         Judgment: Judgment normal.          Physical Exam       Results       Assessment/Plan   Issues Addressed/ Plan   Diagnosis Plan   1. Rash  clotrimazole-betamethasone (LOTRISONE) 1-0.05 % cream      2. Type 2 diabetes mellitus with hyperglycemia, without long-term current use of insulin  Hemoglobin A1c      3. HTN (hypertension), benign  Comprehensive Metabolic Panel      4. Dyslipidemia, goal LDL below 70  Lipid Panel         Assessment & Plan  1. Diabetes.  - A1c levels have shown improvement, indicating effective management.  - Currently on Rybelsus, used as an appetite suppressant.  - Plan to gradually wean off Rybelsus, with dosage reduction at the next refill.  - Monitoring A1c and weight to ensure stability; laboratory tests are due.    2. Right knee pain.  - Reports stiffness in the right knee.  - Scheduled for a right knee procedure in 10/2025.  - Advised that stiffness is expected for up to a year and a half post-procedure.           There are no Patient Instructions on file for this visit.   Follow up  recommended Return in about 3 months (around 7/21/2025).   - Dragon voice recognition software was utilized to complete this chart.  Every reasonable attempt was made to edit and " correct the text, however some incorrect words may remain.   eFrny Mosley DO    Patient or patient representative verbalized consent for the use of Ambient Listening during the visit with  Ferny Mosley DO for chart documentation. 5/8/2025  01:51 EDT

## 2025-04-29 ENCOUNTER — TELEPHONE (OUTPATIENT)
Dept: ORTHOPEDIC SURGERY | Facility: CLINIC | Age: 75
End: 2025-04-29
Payer: MEDICARE

## 2025-04-29 NOTE — TELEPHONE ENCOUNTER
"Caller: Edilma Doss \"ALIA\"    Relationship to patient: Self    Best call back number: [phone]    Chief complaint: RIGHT KNEE PAIN    Type of visit: FOLLOW UP INJECTION    Requested date: ASAP     If rescheduling, when is the original appointment: 6.13.25     Additional notes:PATIENT WANTED ANOTHER INJECTION IN HER KNEE HOWEVER OUR SOONEST AVAILABLE AT THE [place] LOCATION WAS 6.13.25 PATIENT STATES THAT DATE WON'T WORK AS A RESULT OF A SURGERY SHE INTENDS ON HAVING IN NOVEMBER WHERE THE PROVIDER INFORMED THE PATIENT SHE WOULD HAVE TO BE OFF THE INJECTIONS FOR A MINIMUM OF THREE MONTHS PRIOR TO SURGERY. PLEASE CONTACT PATIENT TO BE WORKED IN SOONER IF POSSIBLE.         "

## 2025-05-06 ENCOUNTER — OFFICE VISIT (OUTPATIENT)
Dept: ORTHOPEDIC SURGERY | Facility: CLINIC | Age: 75
End: 2025-05-06
Payer: MEDICARE

## 2025-05-06 VITALS — BODY MASS INDEX: 34.2 KG/M2 | HEIGHT: 63 IN | WEIGHT: 193 LBS

## 2025-05-06 DIAGNOSIS — R79.9 ABNORMAL FINDING OF BLOOD CHEMISTRY, UNSPECIFIED: ICD-10-CM

## 2025-05-06 DIAGNOSIS — M17.11 PRIMARY OSTEOARTHRITIS OF RIGHT KNEE: Primary | ICD-10-CM

## 2025-05-06 DIAGNOSIS — Z47.1 AFTERCARE FOLLOWING RIGHT KNEE JOINT REPLACEMENT SURGERY: ICD-10-CM

## 2025-05-06 DIAGNOSIS — Z96.651 AFTERCARE FOLLOWING RIGHT KNEE JOINT REPLACEMENT SURGERY: ICD-10-CM

## 2025-05-06 RX ORDER — LIDOCAINE HYDROCHLORIDE 10 MG/ML
4 INJECTION, SOLUTION EPIDURAL; INFILTRATION; INTRACAUDAL; PERINEURAL
Status: COMPLETED | OUTPATIENT
Start: 2025-05-06 | End: 2025-05-06

## 2025-05-06 RX ORDER — PREGABALIN 150 MG/1
150 CAPSULE ORAL ONCE
OUTPATIENT
Start: 2025-05-06 | End: 2025-05-06

## 2025-05-06 RX ORDER — TRIAMCINOLONE ACETONIDE 40 MG/ML
80 INJECTION, SUSPENSION INTRA-ARTICULAR; INTRAMUSCULAR
Status: COMPLETED | OUTPATIENT
Start: 2025-05-06 | End: 2025-05-06

## 2025-05-06 RX ORDER — CHLORHEXIDINE GLUCONATE 500 MG/1
CLOTH TOPICAL ONCE
OUTPATIENT
Start: 2025-05-06

## 2025-05-06 RX ADMIN — LIDOCAINE HYDROCHLORIDE 4 ML: 10 INJECTION, SOLUTION EPIDURAL; INFILTRATION; INTRACAUDAL; PERINEURAL at 14:35

## 2025-05-06 RX ADMIN — TRIAMCINOLONE ACETONIDE 80 MG: 40 INJECTION, SUSPENSION INTRA-ARTICULAR; INTRAMUSCULAR at 14:35

## 2025-05-06 NOTE — PROGRESS NOTES
Subjective:     Patient ID: Edilma Doss is a 74 y.o. female.    Chief Complaint:    History of Present Illness  Edilma Doss returns to clinic today for evaluation of right knee pain.  The patient states knee pain is worsening and is located predominately anterior medially in the knee.  She states it is worse with activity and better with rest.  She had an injection back in November and this helped quite a bit.  She is hopeful to get another injection today.  She denies any new injury.     Social History     Occupational History    Not on file   Tobacco Use    Smoking status: Former     Current packs/day: 0.00     Average packs/day: 2.0 packs/day for 32.9 years (65.9 ttl pk-yrs)     Types: Cigarettes     Start date: 1966     Quit date: 11/10/1999     Years since quittin.5     Passive exposure: Past    Smokeless tobacco: Never   Vaping Use    Vaping status: Never Used   Substance and Sexual Activity    Alcohol use: Never    Drug use: Never    Sexual activity: Not Currently     Partners: Male     Birth control/protection: Tubal ligation      Past Medical History:   Diagnosis Date    Allergic     Anxiety     Bursitis of hip hurts when walking and sleeping on side.    COPD (chronic obstructive pulmonary disease)     Coronary artery disease     stent placed    CTS (carpal tunnel syndrome) yes,  ?    Depression     Elevated cholesterol     Frozen shoulder     Hip arthrosis 1122/    pain when walking    Hyperlipidemia     Hypertension     Knee sprain twisted it Sore    Knee swelling     Obesity     Periarthritis of shoulder grinds    Sleep apnea     CPAP    Tear of meniscus of knee left knee, 5yrs. ago now right knee I think.     Past Surgical History:   Procedure Laterality Date    CHOLECYSTECTOMY      COLONOSCOPY      CORONARY STENT PLACEMENT      HAND SURGERY  both thumb joints from repitation movements    SHOULDER SURGERY Left     TONSILLECTOMY      TOTAL KNEE ARTHROPLASTY Left 10/23/2024     "Procedure: TOTAL KNEE ARTHROPLASTY WITH CORI ROBOT;  Surgeon: Rowdy Gracia MD;  Location: [facility];  Service: Robotics - Ortho;  Laterality: Left;    TRIGGER POINT INJECTION  knleft knee    TUBAL ABDOMINAL LIGATION         Family History   Problem Relation Age of Onset    Diabetes Mother     Breast cancer Father     Cancer Sister     Breast cancer Maternal Aunt                  Objective:  Vitals:    25 1432   Weight: 87.5 kg (193 lb)   Height: 160 cm (63\")         25  1432   Weight: 87.5 kg (193 lb)     Body mass index is 34.19 kg/m².           Right Knee Exam     Tenderness   The patient is experiencing tenderness in the medial retinaculum and medial joint line.    Range of Motion   Extension:  0   Flexion:  120     Tests   Sarah:  Medial - positive Lateral - negative  Varus: negative Valgus: negative  Lachman:  Anterior - negative    Posterior - negative  Drawer:  Anterior - negative    Posterior - negative    Other   Erythema: absent  Scars: absent  Sensation: normal  Pulse: present  Swelling: mild  Effusion: no effusion present               Imagin views of the right knee were ordered and reviewed by myself in the office today  Indication: right knee pain  Findings: X-rays demonstrate no acute osseous abnormality.  There is no signs of fracture dislocation or subluxation.  There is  severe  Kellgren and Mendoza grade 4joint space narrowing, subchondral sclerosis, cystic changes, and periarticular osteophytes most pronounced in the Medial joint.  Comparative studies: xrays 2024      Assessment:        1. Primary osteoarthritis of right knee           Plan:  Large Joint Arthrocentesis: R knee  Date/Time: 2025 2:35 PM  Consent given by: patient  Site marked: site marked  Timeout: Immediately prior to procedure a time out was called to verify the correct patient, procedure, equipment, support staff and site/side marked as required   Supporting Documentation  Indications: pain "   Procedure Details  Location: knee - R knee  Preparation: Patient was prepped and draped in the usual sterile fashion  Needle size: 22 G  Approach: anterolateral  Medications administered: 80 mg triamcinolone acetonide 40 MG/ML; 4 mL lidocaine PF 1% 1 %  Patient tolerance: patient tolerated the procedure well with no immediate complications                Discussed treatment options at length with patient at today's visit.  I discussed with the patient that she has developed right knee osteoarthritis that is worsening. I discussed with the patient that the mainstays of conservative treatment for right knee OA include physical therapy, nonsteroidal anti-inflammatories, injections, activity modification, and home exercises.  The patient states that they  would like to try another right knee injection.  At this point time the patient does not need to schedule follow-up with me today.  I am happy to see the patient back at any point time however if issues arise or they fail to make a full recovery.    She has failed conservative management of right knee osteoarthritis.  We discussed further conservative treatments including but not limited to physical therapy, intra-articular injections, nonsteroidal anti-inflammatories, topical creams, use of an assistive device, weight loss, and low impact exercises.  Shevoiced understanding of further nonoperative treatment options but She is interested in proceeding with knee replacement surgery.    The spectrum of treatment options were discussed with the patient in detail including both the nonoperative and operative treatment modalities and their respective risks and benefits.  After thorough discussion, the patient has elected to undergo surgical treatment.  The details of the surgical procedure were explained including the location of probable incisions and a description of the likely implants to be used.  Models and diagrams were used as educational resources. The patient  understands the likely convalescence after surgery, as well as the rehabilitation required.  We thoroughly discussed the risks, benefits, and alternatives to surgery.  The risks include but are not limited to the risk of infection, joint stiffness, blood clots (including DVT and/or pulmonary embolus along with the risk of death), neurologic and/or vascular injury, fracture, dislocation, nonunion, malunion, need for further surgery including hardware failure requiring revision, and continued pain.  It was explained that if tissue has been repaired or reconstructed, there is also a chance of failure which may require further management.  Following the completion of the discussion, the patient expressed understanding of this planned course of care, all their questions were answered and consent will be obtained preoperatively.  I also reviewed the typical postoperative recovery of 6-12 months before maximal recovery, and possible need for rehabilitation stay after hospitalization. I also explained that in some series of patients in the research literature, up to 20% of patients are dissatisfied with their total knee arthroplasty. I also discussed the risks of of anesthesia. I also explained that she would meet with Anesthesiology preoperatively to discuss anesthetic risk.  All questions were answered.       Plan for right total knee arthroplasty in october     Implants: [employer] and Nephew cemented Legion TKS with [employer]  Anticoagulation: Asprin  Antibiotics: Cefazolin  Admission Type: 23 HR Obs  Post op ABX: No  TXA: Yes      Edilma Doss was in agreement with plan and had all questions answered.     Medications:  No orders of the defined types were placed in this encounter.      Followup:  No follow-ups on file.    Diagnoses and all orders for this visit:    1. Primary osteoarthritis of right knee (Primary)  -     Large Joint Arthrocentesis: R knee          Dictated utilizing Dragon dictation

## 2025-05-19 ENCOUNTER — TELEPHONE (OUTPATIENT)
Dept: CARDIOLOGY | Age: 75
End: 2025-05-19
Payer: MEDICARE

## 2025-05-19 NOTE — TELEPHONE ENCOUNTER
5/19/25      Request for Cardiac Clearance:      I have received a request from [facility] Arturo Maldonado stating that they need clearance for the patient who is scheduled to have Right TKA on 10/22/25.    The phone number is [phone]  The fax number is [phone]    Please advise.

## 2025-06-30 ENCOUNTER — OFFICE VISIT (OUTPATIENT)
Dept: FAMILY MEDICINE CLINIC | Facility: CLINIC | Age: 75
End: 2025-06-30
Payer: MEDICARE

## 2025-06-30 VITALS
HEART RATE: 70 BPM | DIASTOLIC BLOOD PRESSURE: 68 MMHG | OXYGEN SATURATION: 95 % | TEMPERATURE: 97.9 F | SYSTOLIC BLOOD PRESSURE: 128 MMHG | BODY MASS INDEX: 34.19 KG/M2 | HEIGHT: 63 IN

## 2025-06-30 DIAGNOSIS — J44.1 COPD WITH EXACERBATION: ICD-10-CM

## 2025-06-30 DIAGNOSIS — R68.89 FLU-LIKE SYMPTOMS: Primary | ICD-10-CM

## 2025-06-30 DIAGNOSIS — J18.9 PNEUMONIA OF LEFT LOWER LOBE DUE TO INFECTIOUS ORGANISM: ICD-10-CM

## 2025-06-30 LAB
EXPIRATION DATE: NORMAL
EXPIRATION DATE: NORMAL
FLUAV AG UPPER RESP QL IA.RAPID: NOT DETECTED
FLUBV AG UPPER RESP QL IA.RAPID: NOT DETECTED
INTERNAL CONTROL: NORMAL
INTERNAL CONTROL: NORMAL
Lab: NORMAL
Lab: NORMAL
S PYO AG THROAT QL: NEGATIVE
SARS-COV-2 AG UPPER RESP QL IA.RAPID: NOT DETECTED

## 2025-06-30 PROCEDURE — 1159F MED LIST DOCD IN RCRD: CPT | Performed by: STUDENT IN AN ORGANIZED HEALTH CARE EDUCATION/TRAINING PROGRAM

## 2025-06-30 PROCEDURE — 87428 SARSCOV & INF VIR A&B AG IA: CPT | Performed by: STUDENT IN AN ORGANIZED HEALTH CARE EDUCATION/TRAINING PROGRAM

## 2025-06-30 PROCEDURE — 3074F SYST BP LT 130 MM HG: CPT | Performed by: STUDENT IN AN ORGANIZED HEALTH CARE EDUCATION/TRAINING PROGRAM

## 2025-06-30 PROCEDURE — 1125F AMNT PAIN NOTED PAIN PRSNT: CPT | Performed by: STUDENT IN AN ORGANIZED HEALTH CARE EDUCATION/TRAINING PROGRAM

## 2025-06-30 PROCEDURE — 99213 OFFICE O/P EST LOW 20 MIN: CPT | Performed by: STUDENT IN AN ORGANIZED HEALTH CARE EDUCATION/TRAINING PROGRAM

## 2025-06-30 PROCEDURE — 87880 STREP A ASSAY W/OPTIC: CPT | Performed by: STUDENT IN AN ORGANIZED HEALTH CARE EDUCATION/TRAINING PROGRAM

## 2025-06-30 PROCEDURE — 1160F RVW MEDS BY RX/DR IN RCRD: CPT | Performed by: STUDENT IN AN ORGANIZED HEALTH CARE EDUCATION/TRAINING PROGRAM

## 2025-06-30 PROCEDURE — 3078F DIAST BP <80 MM HG: CPT | Performed by: STUDENT IN AN ORGANIZED HEALTH CARE EDUCATION/TRAINING PROGRAM

## 2025-06-30 RX ORDER — PREDNISONE 20 MG/1
TABLET ORAL
Qty: 19 TABLET | Refills: 0 | Status: SHIPPED | OUTPATIENT
Start: 2025-06-30 | End: 2025-07-10

## 2025-06-30 RX ORDER — AZITHROMYCIN 250 MG/1
TABLET, FILM COATED ORAL
Qty: 6 TABLET | Refills: 0 | Status: SHIPPED | OUTPATIENT
Start: 2025-06-30

## 2025-06-30 NOTE — PROGRESS NOTES
Ferny Mosley,   [facility]  [address]  [place] [id number] [phone]    Subjective      Name Edilma Doss MRN [id number]    1950 AGE/SEX 74 y.o. / female      Chief Complaint Chief Complaint   Patient presents with    Sinusitis     Sore throat, cough, drainage, sore throat chest discomfort from cough. Feverish and fatigued. 8 days. Has been taking sinus medication, mucinex and cough syrup. Has been exposed to strep          Visit History for  2025    Edilma Doss is a 74 y.o. female who presented today for Sinusitis (Sore throat, cough, drainage, sore throat chest discomfort from cough. Feverish and fatigued. 8 days. Has been taking sinus medication, mucinex and cough syrup. Has been exposed to strep )       History of Present Illness  She reports an improvement in her condition, with the first significant reduction in coughing observed today. She has been experiencing a productive cough with dark yellow, thick sputum for the past 8 days. Initially, she had a fever and has been managing her symptoms with Mucinex, over-the-counter cold pills, and sinus medication. She also reports intermittent ear pain, more pronounced on the left side. Her throat has been sore, preventing her from using her CPAP machine. She has not taken Augmentin before. She has been using her albuterol rescue inhaler frequently but has not needed it for the past 3 to 4 days due to difficulty inhaling it without triggering a cough. She does not require a refill at this time. She has been taking a multivitamin and vitamin C daily.    Her sleep has been disrupted due to the cough, leading her to take melatonin at 12:30 AM after going to bed at 11:00 PM.    She has a history of pneumonia twice in the 1970s and pleurisy once.       Medications and Allergies   Current Outpatient Medications   Medication Instructions    amoxicillin-clavulanate (AUGMENTIN) 875-125 MG per tablet  "1 tablet, Oral, 2 Times Daily    azithromycin (Zithromax Z-London) 250 MG tablet Take 2 tablets by mouth on day 1, then 1 tablet daily on days 2-5    cholecalciferol (VITAMIN D3) 400 Units    CINNAMON PO Take  by mouth.    clotrimazole-betamethasone (LOTRISONE) 1-0.05 % cream Topical, 2 Times Daily    escitalopram (LEXAPRO) 10 mg, Oral, Daily    ezetimibe (ZETIA) 10 mg, Oral, Daily    Flax Seed Oil 1,000 mg    Fluticasone-Umeclidin-Vilant (Trelegy Ellipta) 100-62.5-25 MCG/ACT inhaler 1 puff    Magnesium 250 MG tablet Take  by mouth.    metoprolol succinate XL (TOPROL-XL) 50 mg, Oral, Daily    nitroglycerin (NITROSTAT) 0.4 mg, Sublingual, Every 5 Minutes PRN, Take no more than 3 doses in 15 minutes.    predniSONE (DELTASONE) 20 MG tablet Take 3 tablets by mouth Daily for 3 days, THEN 2 tablets Daily for 3 days, THEN 1 tablet Daily for 4 days.    Rybelsus 14 mg, Oral, Daily    sennosides-docusate (senna-docusate sodium) 8.6-50 MG per tablet 1 tablet, Oral, Daily    vitamin C (ASCORBIC ACID) 500 mg, Daily     No Known Allergies   I have reviewed the above medications and allergies     Objective:      Vitals Vitals:    06/30/25 1312   BP: 128/68   BP Location: Left arm   Patient Position: Sitting   Cuff Size: Adult   Pulse: 70   Temp: 97.9 °F (36.6 °C)   TempSrc: Oral   SpO2: 95%   Height: 160 cm (63\")     Body mass index is 34.19 kg/m².    Physical Exam  Vitals reviewed.   Constitutional:       General: She is not in acute distress.     Appearance: She is not ill-appearing.   Cardiovascular:      Rate and Rhythm: Normal rate and regular rhythm.   Pulmonary:      Effort: Pulmonary effort is normal.      Breath sounds: Examination of the left-middle field reveals rhonchi. Examination of the left-lower field reveals wheezing and rhonchi. Wheezing and rhonchi present.   Psychiatric:         Mood and Affect: Mood normal.         Behavior: Behavior normal.         Thought Content: Thought content normal.         Judgment: " Judgment normal.        Physical Exam  Mouth/Throat: Erythema noted in the throat.  Respiratory: Crackles noted in the right lung. Severe expiratory wheeze and numerous crackles present in the left lung.     Results  Labs   - Strep test: Negative   - COVID-19 test: Negative   - Influenza test: Negative     Assessment/Plan   Issues Addressed/ Plan   Diagnosis Plan   1. Flu-like symptoms  POCT rapid strep A    POCT SARS-CoV-2 Antigen KATIE + Flu      2. Pneumonia of left lower lobe due to infectious organism  amoxicillin-clavulanate (AUGMENTIN) 875-125 MG per tablet    predniSONE (DELTASONE) 20 MG tablet    azithromycin (Zithromax Z-London) 250 MG tablet      3. COPD with exacerbation  predniSONE (DELTASONE) 20 MG tablet         Assessment & Plan  1. Left-sided pneumonia.  - Symptoms include productive cough with dark yellow, thick mucus, sore throat, and intermittent ear pain.  - Physical examination findings reveal crackles and an expiratory wheeze on the left side. Tests for strep, COVID-19, and influenza were negative.  - The infection appears to have transitioned from viral to bacterial. She is advised to continue taking her multivitamin and vitamin C supplements daily.  - Prescriptions for Augmentin and a Z-London have been provided to address the bacterial infection and manage inflammation associated with her COPD. Prednisone will be used to alleviate her sore throat and improve her breathing. She is encouraged to practice deep breathing exercises once or twice daily to help open her lungs and facilitate expectoration.    2. Chronic obstructive pulmonary disease (COPD).  - Due to her COPD, a prednisone taper has been prescribed: 60 mg for 3 days, 40 mg for 3 days, and 20 mg for 4 days.  - This will help with her breathing and reduce inflammation.  - She is advised to use her albuterol rescue inhaler as needed, although she has not used it in the past three to four days due to coughing.  - Encouraged to continue taking  her multivitamin and vitamin C supplements daily.           There are no Patient Instructions on file for this visit.   Follow up  recommended Return if symptoms worsen or fail to improve.   - Dragon voice recognition software was utilized to complete this chart.  Every reasonable attempt was made to edit and correct the text, however some incorrect words may remain.   Ferny Mosley DO    Patient or patient representative verbalized consent for the use of Ambient Listening during the visit with  Ferny Mosley DO for chart documentation. 6/30/2025  14:03 EDT

## 2025-07-02 DIAGNOSIS — F33.41 RECURRENT MAJOR DEPRESSIVE DISORDER, IN PARTIAL REMISSION: ICD-10-CM

## 2025-07-03 RX ORDER — ESCITALOPRAM OXALATE 10 MG/1
10 TABLET ORAL DAILY
Qty: 90 TABLET | Refills: 3 | Status: SHIPPED | OUTPATIENT
Start: 2025-07-03

## 2025-07-25 ENCOUNTER — OFFICE VISIT (OUTPATIENT)
Age: 75
End: 2025-07-25
Payer: MEDICARE

## 2025-07-25 VITALS
SYSTOLIC BLOOD PRESSURE: 120 MMHG | WEIGHT: 195 LBS | BODY MASS INDEX: 34.55 KG/M2 | DIASTOLIC BLOOD PRESSURE: 72 MMHG | OXYGEN SATURATION: 98 % | HEIGHT: 63 IN

## 2025-07-25 DIAGNOSIS — E78.5 DYSLIPIDEMIA, GOAL LDL BELOW 70: ICD-10-CM

## 2025-07-25 DIAGNOSIS — I73.9 PAD (PERIPHERAL ARTERY DISEASE): Primary | ICD-10-CM

## 2025-07-25 DIAGNOSIS — I10 HTN (HYPERTENSION), BENIGN: ICD-10-CM

## 2025-07-25 DIAGNOSIS — R73.03 PREDIABETES: ICD-10-CM

## 2025-07-25 DIAGNOSIS — I25.10 CAD IN NATIVE ARTERY: ICD-10-CM

## 2025-07-25 RX ORDER — ASPIRIN 81 MG/1
81 TABLET ORAL DAILY
COMMUNITY

## 2025-07-25 NOTE — PROGRESS NOTES
"    CARDIOLOGY        Patient Name: Edilma Doss  :1950  Age: 74 y.o.  Primary Cardiologist: Vega Javier MD  Encounter Provider:  Sulaiman Evans PA-C    Date of Service: 25            CHIEF COMPLAINT / REASON FOR OFFICE VISIT     6-month follow-up      HISTORY OF PRESENT ILLNESS       HPI  Edilma Doss is a 74 y.o. female who presents today for 6-month follow-up.     Pt has a  history significant for CAD s/p PCI , PAD, hyperlipidemia, and hypertension presents for 6-month follow-up.  Patient was seen on 2025 where she was intentionally losing weight over the past few years.  She felt her functional capacity was good.  She was able to do work around the house without any chest pain or dyspnea.    Patient has been doing well since her last office visit.  She has not had any CV symptoms.  She denies any chest pain, chest pressure, shortness of breath, palpitations, edema to her legs, fatigue, or lightheadedness or dizziness.  She is on 81 mg aspirin send does bruise slightly but this is improved since been taken off her Plavix.  She mentioned she is to have her right knee replaced with Dr. Gracia in October and needs surgical clearance.  She mentions she is highly active and is able to do her ADLs and walk multiple blocks without issues.    The following portions of the patient's history were reviewed and updated as appropriate: allergies, current medications, past family history, past medical history, past social history, past surgical history and problem list.      VITAL SIGNS     Visit Vitals  /72 (BP Location: Left arm, Patient Position: Sitting, Cuff Size: Adult)   Ht 160 cm (63\")   Wt 88.5 kg (195 lb)   SpO2 98%   BMI 34.54 kg/m²       @[email or web address]  Wt Readings from Last 3 Encounters:   25 88.5 kg (195 lb)   25 87.5 kg (193 lb)   25 87.8 kg (193 lb 9.6 oz)     Body mass index is 34.54 kg/m².        PHYSICAL EXAMINATION     Constitutional:  "      General: Awake. Not in acute distress.     Appearance: Not in distress.   Pulmonary:      Effort: Pulmonary effort is normal.      Breath sounds: Normal breath sounds.   Cardiovascular:      Normal rate. Regular rhythm.      Murmurs: There is no murmur.   Skin:     General: Skin is warm.   Neurological:      Mental Status: Alert.   Psychiatric:         Behavior: Behavior is cooperative.           REVIEWED DATA       ECG 12 Lead    Date/Time: 7/25/2025 1:17 PM  Performed by: Sulaiman Evans PA-C    Authorized by: Sulaiman Evans PA-C  Comparison: compared with previous ECG   Similar to previous ECG  Rhythm: sinus rhythm  Rate: normal  BPM: 66  QRS axis: normal    Clinical impression: normal ECG  Comments: Similar to previous          Cardiac Procedures:    Echocardiogram January 2024:    Left ventricular systolic function is normal. Calculated left ventricular EF = 58.8%    Left ventricular diastolic function was normal.    Estimated right ventricular systolic pressure from tricuspid regurgitation is normal (<35 mmHg).     Holter monitor November 2023:    A relatively benign monitor study.    There were a total of 3 supraventricular runs with longest lasting 19.2 seconds with average heart rate of 110 bpm.    A total of 14 patient triggered and 13 diary events submitted.  Symptoms included skipped beats/lightheadedness.  Most did not have any arrhythmia correlation but few episodes correlate with premature ventricular complexes.       Lipid Panel          1/21/2025    00:00   Lipid Panel   Total Cholesterol 209    Triglycerides 170    HDL Cholesterol 56    VLDL Cholesterol 30    LDL Cholesterol  123        Lab Results   Component Value Date     01/21/2025     10/24/2024    K 4.5 01/21/2025    K 4.2 10/24/2024     01/21/2025     (H) 10/24/2024    CO2 25 01/21/2025    CO2 21.9 (L) 10/24/2024    BUN 18 01/21/2025    BUN 25 (H) 10/24/2024    CREATININE 0.72 01/21/2025     "CREATININE 0.93 10/24/2024    GLUCOSE 84 01/21/2025    GLUCOSE 114 (H) 10/24/2024    CALCIUM 9.9 01/21/2025    CALCIUM 9.2 10/24/2024    ALBUMIN 4.4 01/21/2025    ALBUMIN 4.1 07/01/2024    BILITOT 0.2 01/21/2025    BILITOT 0.4 07/01/2024    AST 13 01/21/2025    AST 23 07/01/2024    ALT 17 01/21/2025    ALT 25 07/01/2024     Lab Results   Component Value Date    WBC 8.8 01/21/2025    WBC 13.74 (H) 10/24/2024    HGB 13.8 01/21/2025    HGB 11.0 (L) 10/24/2024    HCT 42.7 01/21/2025    HCT 34.7 10/24/2024    MCV 89 01/21/2025    MCV 92.8 10/24/2024     01/21/2025     10/24/2024     No results found for: \"PROBNP\", \"BNP\"  No results found for: \"CKTOTAL\", \"CKMB\", \"CKMBINDEX\", \"TROPONINI\", \"TROPONINT\"  No results found for: \"TSH\"          ASSESSMENT & PLAN     Diagnoses and all orders for this visit:    Coronary disease status post PCI, remote, [facility]: Free of angina  Mild ischemic cardiomyopathy EF 45 to 50%, now with recovered LVEF 58%.   Continue metoprolol succinate 50 daily   Continue aspirin monotherapy.  She had significant bruising on Plavix monotherapy.  Palpitations: Fleeting, short episodes that are likely symptomatic PVCs.  2-week patch Holter did not have any significant arrhythmias.  Controlled on metoprolol succinate  Hyperlipidemia, goal LDL less than 70  LDL 44 on current regimen of Ezetimibe.  Continue  Mild PAD.  Noted from previous cardiac evaluation.  She has some complaint of her right upper extremity becoming slightly cold after she drinks coffee in the morning by my exam today her radial pulses are equal bilateral.  There may be component of mild PAD but I see nothing high risk year.  Continue medical therapy.  Pre-op clearance.  Patient is at acceptable risk to proceed with her right knee arthroplasty with Dr. Gracia.        Patient is doing well from a CV standpoint.  Patient has good exercise tolerance.  She will be cleared for surgery.  She is to hold her aspirin for " 5 days prior to procedure.  She will follow-up with Dr. Quinn in 6 months.     Return in about 6 months (around 1/25/2026) for Dr. Javier.    Future Appointments         Provider Department Center    9/2/2025 1:00 PM (Arrive by 12:45 PM) Rowdy Gracia MD [facility]    10/7/2025 1:00 PM [facility] PREADMISSION T [facility]    10/9/2025 2:15 PM Ferny Mosley DO [facility] PRIMARY [facility]    11/4/2025 1:30 PM (Arrive by 1:15 PM) Jim Pelayo MD [facility]    1/28/2026 1:15 PM (Arrive by 1:00 PM) Vega Javier MD [facility] CARDIOLOGY [facility]                MEDICATIONS         Discharge Medications            Accurate as of July 25, 2025  1:41 PM. If you have any questions, ask your nurse or doctor.                Continue These Medications        Instructions Start Date   aspirin 81 MG EC tablet   81 mg, Daily      cholecalciferol 10 MCG (400 UNIT) tablet  Commonly known as: VITAMIN D3   400 Units      CINNAMON PO   Take  by mouth.      clotrimazole-betamethasone 1-0.05 % cream  Commonly known as: LOTRISONE   Topical, 2 Times Daily      escitalopram 10 MG tablet  Commonly known as: LEXAPRO   10 mg, Oral, Daily      ezetimibe 10 MG tablet  Commonly known as: ZETIA   10 mg, Oral, Daily      Flax Seed Oil 1000 MG capsule   1,000 mg      Magnesium 250 MG tablet   Take  by mouth.      metoprolol succinate XL 50 MG 24 hr tablet  Commonly known as: TOPROL-XL   50 mg, Oral, Daily      nitroglycerin 0.4 MG SL tablet  Commonly known as: NITROSTAT   0.4 mg, Sublingual, Every 5 Minutes PRN, Take no more than 3 doses in 15 minutes.      Rybelsus 14 MG tablet  Generic drug: Semaglutide   14 mg, Oral, Daily      Stool Softener Plus Laxative 8.6-50 MG per tablet  Generic drug: sennosides-docusate   1 tablet, Oral, Daily      Trelegy Ellipta 100-62.5-25 MCG/ACT inhaler  Generic drug:  Fluticasone-Umeclidin-Vilant   1 puff      vitamin C 250 MG tablet  Commonly known as: ASCORBIC ACID   500 mg, Daily                   **Dragon Disclaimer:   Much of this encounter note is an electronic transcription/translation of spoken language to printed text. The electronic translation of spoken language may permit erroneous, or at times, nonsensical words or phrases to be inadvertently transcribed. Although I have reviewed the note for such errors, some may still exist.

## 2025-07-28 RX ORDER — ORAL SEMAGLUTIDE 14 MG/1
1 TABLET ORAL DAILY
Qty: 90 TABLET | Refills: 3 | Status: SHIPPED | OUTPATIENT
Start: 2025-07-28

## (undated) DEVICE — SYR LUERLOK 30CC

## (undated) DEVICE — NEEDLE, QUINCKE, 20GX3.5": Brand: MEDLINE

## (undated) DEVICE — SUT ETHIB 1 CTX CR8 18IN CX30D

## (undated) DEVICE — SOL IRR H2O BTL 1000ML STRL

## (undated) DEVICE — FOAM BUMP ROUND LARGE: Brand: MEDLINE INDUSTRIES, INC.

## (undated) DEVICE — GLV SURG SENSICARE PI LF PF 7.5

## (undated) DEVICE — FRAZIER SUCTION INSTRUMENT 10 FR W/CONTROL VENT & OBTURATOR: Brand: FRAZIER

## (undated) DEVICE — 3M™ IOBAN™ 2 ANTIMICROBIAL INCISE DRAPE 6651EZ: Brand: IOBAN™ 2

## (undated) DEVICE — MAT FLR ABSORBENT LG 4FT 10 2.5FT

## (undated) DEVICE — INTENDED USE FOR SURGICAL MARKING ON INTACT SKIN, ALSO PROVIDES A PERMANENT METHOD OF IDENTIFYING OBJECTS IN THE OPERATING ROOM: Brand: WRITESITE® REGULAR TIP SKIN MARKER

## (undated) DEVICE — GLV SURG SENSICARE PI LF PF 8 GRN STRL

## (undated) DEVICE — 3M™ DURAPORE™ SURGICAL TAPE 2 INCHES X 10YARDS (5.0CM X 9.1M) 6ROLLS/CARTON 10CARTONS/CASE 1538-2: Brand: 3M™ DURAPORE™

## (undated) DEVICE — WRAP KN COMPR COLD/THERP

## (undated) DEVICE — SOL ISO/ALC RUB 70PCT 4OZ

## (undated) DEVICE — INTENDED FOR TISSUE SEPARATION, AND OTHER PROCEDURES THAT REQUIRE A SHARP SURGICAL BLADE TO PUNCTURE OR CUT.: Brand: BARD-PARKER ® STAINLESS STEEL BLADES

## (undated) DEVICE — PK TOTL 90

## (undated) DEVICE — PREP SOL POVIDONE/IODINE BT 4OZ

## (undated) DEVICE — YANKAUER,BULB TIP,W/O VENT,RIGID,STERILE: Brand: MEDLINE

## (undated) DEVICE — Device

## (undated) DEVICE — ADHS SKIN SURG TISS VISC PREMIERPRO EXOFIN HI/VISC FAST/DRY

## (undated) DEVICE — DRP SURG U/DRP INVISISHIELD PA 48X52IN

## (undated) DEVICE — SPNG GZ WOVN 4X4IN 12PLY 10/BX STRL

## (undated) DEVICE — DUAL CUT SAGITTAL BLADE